# Patient Record
Sex: MALE | Race: WHITE | NOT HISPANIC OR LATINO | Employment: OTHER | URBAN - METROPOLITAN AREA
[De-identification: names, ages, dates, MRNs, and addresses within clinical notes are randomized per-mention and may not be internally consistent; named-entity substitution may affect disease eponyms.]

---

## 2018-03-14 ENCOUNTER — OFFICE VISIT (OUTPATIENT)
Dept: PRIMARY CARE CLINIC | Facility: CLINIC | Age: 23
End: 2018-03-14
Payer: COMMERCIAL

## 2018-03-14 VITALS
BODY MASS INDEX: 29.12 KG/M2 | SYSTOLIC BLOOD PRESSURE: 134 MMHG | HEART RATE: 77 BPM | HEIGHT: 71 IN | TEMPERATURE: 98 F | RESPIRATION RATE: 18 BRPM | DIASTOLIC BLOOD PRESSURE: 85 MMHG | OXYGEN SATURATION: 97 % | WEIGHT: 208 LBS

## 2018-03-14 DIAGNOSIS — E10.9 TYPE 1 DIABETES MELLITUS WITHOUT COMPLICATION: ICD-10-CM

## 2018-03-14 DIAGNOSIS — K59.39: ICD-10-CM

## 2018-03-14 DIAGNOSIS — K59.09 CHRONIC CONSTIPATION: Primary | ICD-10-CM

## 2018-03-14 PROBLEM — F42.9 OCD (OBSESSIVE COMPULSIVE DISORDER): Status: ACTIVE | Noted: 2018-03-14

## 2018-03-14 PROBLEM — F95.2 TOURETTE DISEASE: Status: ACTIVE | Noted: 2018-03-14

## 2018-03-14 PROBLEM — F84.0 AUTISM SPECTRUM DISORDER: Status: ACTIVE | Noted: 2018-03-14

## 2018-03-14 PROBLEM — F90.2 ATTENTION DEFICIT HYPERACTIVITY DISORDER, COMBINED TYPE: Status: ACTIVE | Noted: 2018-03-14

## 2018-03-14 PROCEDURE — 99999 PR PBB SHADOW E&M-EST. PATIENT-LVL III: CPT | Mod: PBBFAC,,, | Performed by: FAMILY MEDICINE

## 2018-03-14 PROCEDURE — 99204 OFFICE O/P NEW MOD 45 MIN: CPT | Mod: S$GLB,,, | Performed by: FAMILY MEDICINE

## 2018-03-14 RX ORDER — INSULIN LISPRO 100 [IU]/ML
90 INJECTION, SOLUTION INTRAVENOUS; SUBCUTANEOUS DAILY
COMMUNITY
Start: 2018-03-05 | End: 2021-04-14 | Stop reason: SDUPTHER

## 2018-03-14 RX ORDER — CETIRIZINE HYDROCHLORIDE 10 MG/1
10 TABLET ORAL DAILY
COMMUNITY

## 2018-03-14 NOTE — PROGRESS NOTES
"Subjective:       Patient ID: Jonathon Daniel is a 22 y.o. male.    Chief Complaint: Constipation    Constipation   This is a chronic problem. The current episode started more than 1 year ago. The stool is described as firm. The patient is on a high fiber diet. He does not exercise regularly. There has been adequate water intake. Associated symptoms include abdominal pain and rectal pain. Pertinent negatives include no anorexia, difficulty urinating, fecal incontinence, fever, hematochezia, hemorrhoids, melena, nausea or vomiting. He has tried diet changes, fiber, laxatives and stool softeners (probiotics) for the symptoms. The treatment provided mild relief. His past medical history is significant for endocrine disease (type 1 diabetes). There is no history of abdominal surgery, neurologic disease, neuromuscular disease or radiation treatment.     Review of Systems   Constitutional: Negative for chills, fever and unexpected weight change.   HENT: Negative for trouble swallowing.    Eyes: Negative for visual disturbance.   Respiratory: Negative for shortness of breath.    Cardiovascular: Negative for chest pain.   Gastrointestinal: Positive for abdominal pain, constipation and rectal pain. Negative for anorexia, blood in stool, hematochezia, hemorrhoids, melena, nausea and vomiting.   Genitourinary: Negative for difficulty urinating.   Musculoskeletal: Negative for joint swelling.   Skin: Negative for rash.   Neurological: Negative for dizziness and light-headedness.   Hematological: Does not bruise/bleed easily.       Objective:      Vitals:    03/14/18 1607   BP: 134/85   BP Location: Right arm   Patient Position: Sitting   BP Method: Large (Automatic)   Pulse: 77   Resp: 18   Temp: 98.1 °F (36.7 °C)   TempSrc: Oral   SpO2: 97%   Weight: 94.3 kg (208 lb)   Height: 5' 11" (1.803 m)     Physical Exam   Constitutional: He is oriented to person, place, and time. He appears well-developed and well-nourished.   HENT: "   Head: Normocephalic and atraumatic.   Eyes: EOM are normal. Pupils are equal, round, and reactive to light.   Neck: Neck supple. No JVD present.   Cardiovascular: Normal rate, regular rhythm and normal heart sounds.    Pulmonary/Chest: Effort normal and breath sounds normal.   Abdominal: Soft. Bowel sounds are normal. He exhibits no distension and no mass. There is no tenderness. There is no rebound and no guarding.   Musculoskeletal: He exhibits no edema.   Neurological: He is alert and oriented to person, place, and time.   Skin: Skin is warm and dry.   Psychiatric: He has a normal mood and affect. His behavior is normal.   Nursing note and vitals reviewed.      Assessment:       1. Chronic constipation    2. Idiopathic megacolon    3. Type 1 diabetes mellitus without complication        Plan:       Chronic constipation  -     linaclotide (LINZESS) 290 mcg Cap; Take 1 capsule (290 mcg total) by mouth once daily.  Dispense: 30 capsule; Refill: 1    Idiopathic megacolon    Type 1 diabetes mellitus without complication  Comments:  A1C 8.9% in December, managed by endocrinologist      Medication List with Changes/Refills   New Medications    LINACLOTIDE (LINZESS) 290 MCG CAP    Take 1 capsule (290 mcg total) by mouth once daily.   Current Medications    CETIRIZINE (ZYRTEC) 10 MG TABLET    Take 10 mg by mouth once daily.    DOCUSATE SODIUM (STOOL SOFTENER ORAL)    Take by mouth once daily.    HUMALOG U-100 INSULIN 100 UNIT/ML INJECTION    42 Units once daily. Via Insulin Pump    POLYETHYLENE GLYCOL 3350 (MIRALAX ORAL)    Take by mouth once daily.

## 2018-04-23 DIAGNOSIS — K59.09 CHRONIC CONSTIPATION: ICD-10-CM

## 2018-04-23 RX ORDER — LINACLOTIDE 290 UG/1
290 CAPSULE, GELATIN COATED ORAL DAILY
Qty: 30 CAPSULE | Refills: 5 | Status: SHIPPED | OUTPATIENT
Start: 2018-04-23 | End: 2018-11-16

## 2018-11-16 DIAGNOSIS — K59.09 CHRONIC CONSTIPATION: ICD-10-CM

## 2018-11-16 RX ORDER — LINACLOTIDE 290 UG/1
CAPSULE, GELATIN COATED ORAL
Qty: 30 CAPSULE | Refills: 1 | Status: SHIPPED | OUTPATIENT
Start: 2018-11-16 | End: 2018-12-15 | Stop reason: SDUPTHER

## 2018-12-15 DIAGNOSIS — K59.09 CHRONIC CONSTIPATION: ICD-10-CM

## 2018-12-17 RX ORDER — LINACLOTIDE 290 UG/1
CAPSULE, GELATIN COATED ORAL
Qty: 30 CAPSULE | Refills: 1 | Status: SHIPPED | OUTPATIENT
Start: 2018-12-17 | End: 2019-03-16 | Stop reason: SDUPTHER

## 2019-02-11 DIAGNOSIS — K59.09 CHRONIC CONSTIPATION: ICD-10-CM

## 2019-02-11 RX ORDER — LINACLOTIDE 290 UG/1
CAPSULE, GELATIN COATED ORAL
Qty: 30 CAPSULE | Refills: 1 | OUTPATIENT
Start: 2019-02-11

## 2019-03-16 DIAGNOSIS — K59.09 CHRONIC CONSTIPATION: ICD-10-CM

## 2019-03-19 RX ORDER — LINACLOTIDE 290 UG/1
CAPSULE, GELATIN COATED ORAL
Qty: 30 CAPSULE | Refills: 1 | Status: SHIPPED | OUTPATIENT
Start: 2019-03-19 | End: 2019-04-22 | Stop reason: SDUPTHER

## 2019-04-22 DIAGNOSIS — K59.09 CHRONIC CONSTIPATION: ICD-10-CM

## 2019-04-22 RX ORDER — LINACLOTIDE 290 UG/1
CAPSULE, GELATIN COATED ORAL
Qty: 30 CAPSULE | Refills: 1 | Status: SHIPPED | OUTPATIENT
Start: 2019-04-22 | End: 2019-07-09 | Stop reason: SDUPTHER

## 2019-07-09 DIAGNOSIS — K59.09 CHRONIC CONSTIPATION: ICD-10-CM

## 2019-07-09 RX ORDER — LINACLOTIDE 290 UG/1
CAPSULE, GELATIN COATED ORAL
Qty: 30 CAPSULE | Refills: 1 | Status: SHIPPED | OUTPATIENT
Start: 2019-07-09 | End: 2019-10-22 | Stop reason: SDUPTHER

## 2019-09-03 ENCOUNTER — PATIENT OUTREACH (OUTPATIENT)
Dept: ADMINISTRATIVE | Facility: HOSPITAL | Age: 24
End: 2019-09-03

## 2019-09-03 NOTE — PROGRESS NOTES
Patient has not been seen in over a year.  Spoke w/ patient's mother, Ya.   Patient scheduled for follow up 10/07/19.

## 2019-10-22 DIAGNOSIS — K59.09 CHRONIC CONSTIPATION: ICD-10-CM

## 2019-10-22 RX ORDER — LINACLOTIDE 290 UG/1
CAPSULE, GELATIN COATED ORAL
Qty: 30 CAPSULE | Refills: 0 | Status: SHIPPED | OUTPATIENT
Start: 2019-10-22 | End: 2019-12-13 | Stop reason: SDUPTHER

## 2019-12-13 DIAGNOSIS — K59.09 CHRONIC CONSTIPATION: ICD-10-CM

## 2019-12-13 RX ORDER — LINACLOTIDE 290 UG/1
CAPSULE, GELATIN COATED ORAL
Qty: 30 CAPSULE | Refills: 0 | Status: SHIPPED | OUTPATIENT
Start: 2019-12-13 | End: 2020-01-20

## 2019-12-13 NOTE — TELEPHONE ENCOUNTER
Please let the patient know I have refilled the requested medication for 30 days.  A follow-up needs to be scheduled as the patient has not been seen in an extensive amount of time.  Please see PCP for any further refills.

## 2019-12-13 NOTE — TELEPHONE ENCOUNTER
Spoke with patient's mother, notified her that medication was refilled but for any additional refills he will have to come in to be seen in clinic. States understanding

## 2020-01-20 DIAGNOSIS — K59.09 CHRONIC CONSTIPATION: ICD-10-CM

## 2020-01-20 RX ORDER — LINACLOTIDE 290 UG/1
CAPSULE, GELATIN COATED ORAL
Qty: 30 CAPSULE | Refills: 0 | Status: SHIPPED | OUTPATIENT
Start: 2020-01-20 | End: 2021-06-15

## 2020-01-23 ENCOUNTER — TELEPHONE (OUTPATIENT)
Dept: PRIMARY CARE CLINIC | Facility: CLINIC | Age: 25
End: 2020-01-23

## 2020-01-23 NOTE — TELEPHONE ENCOUNTER
Phoned pt got voicemail left msg med sent to pharm and pt needs to sched appt with pcp, left ofc number for pt to call with any further questions

## 2020-01-23 NOTE — TELEPHONE ENCOUNTER
Phoned pt to notify that refill request was sent to the pharmacy but patient does need to schedule a follow up appointment with his pcp

## 2020-02-03 ENCOUNTER — TELEPHONE (OUTPATIENT)
Dept: PRIMARY CARE CLINIC | Facility: CLINIC | Age: 25
End: 2020-02-03

## 2020-02-03 NOTE — TELEPHONE ENCOUNTER
----- Message from Obdulia Joshi sent at 2/3/2020 12:19 PM CST -----  Contact: [phone]  Patient is requesting to see the doctor on 02- for a physical and medication review.  Kenneth advise, thank you

## 2021-04-13 ENCOUNTER — OFFICE VISIT (OUTPATIENT)
Dept: DIABETES | Facility: CLINIC | Age: 26
End: 2021-04-13
Payer: MEDICAID

## 2021-04-13 VITALS
WEIGHT: 200 LBS | HEIGHT: 70 IN | OXYGEN SATURATION: 98 % | HEART RATE: 103 BPM | SYSTOLIC BLOOD PRESSURE: 142 MMHG | BODY MASS INDEX: 28.63 KG/M2 | DIASTOLIC BLOOD PRESSURE: 77 MMHG

## 2021-04-13 DIAGNOSIS — E10.65 TYPE 1 DIABETES MELLITUS WITH HYPERGLYCEMIA: Primary | ICD-10-CM

## 2021-04-13 DIAGNOSIS — E88.819 INSULIN RESISTANCE: ICD-10-CM

## 2021-04-13 DIAGNOSIS — Z96.41 INSULIN PUMP IN PLACE: ICD-10-CM

## 2021-04-13 DIAGNOSIS — R11.2 NAUSEA AND VOMITING, INTRACTABILITY OF VOMITING NOT SPECIFIED, UNSPECIFIED VOMITING TYPE: ICD-10-CM

## 2021-04-13 DIAGNOSIS — Z46.81 INSULIN PUMP FITTING OR ADJUSTMENT: ICD-10-CM

## 2021-04-13 DIAGNOSIS — Z86.69 HISTORY OF ANOXIC BRAIN INJURY: ICD-10-CM

## 2021-04-13 DIAGNOSIS — Z71.9 HEALTH EDUCATION/COUNSELING: ICD-10-CM

## 2021-04-13 DIAGNOSIS — R03.0 ELEVATED BLOOD PRESSURE READING IN OFFICE WITHOUT DIAGNOSIS OF HYPERTENSION: ICD-10-CM

## 2021-04-13 PROBLEM — G93.1 ANOXIC BRAIN INJURY: Status: ACTIVE | Noted: 2021-04-13

## 2021-04-13 PROCEDURE — 99205 OFFICE O/P NEW HI 60 MIN: CPT | Mod: S$PBB,,, | Performed by: NURSE PRACTITIONER

## 2021-04-13 PROCEDURE — 99999 PR PBB SHADOW E&M-NEW PATIENT-LVL III: ICD-10-PCS | Mod: PBBFAC,,, | Performed by: NURSE PRACTITIONER

## 2021-04-13 PROCEDURE — 99205 PR OFFICE/OUTPT VISIT, NEW, LEVL V, 60-74 MIN: ICD-10-PCS | Mod: S$PBB,,, | Performed by: NURSE PRACTITIONER

## 2021-04-13 PROCEDURE — 99999 PR PBB SHADOW E&M-NEW PATIENT-LVL III: CPT | Mod: PBBFAC,,, | Performed by: NURSE PRACTITIONER

## 2021-04-13 PROCEDURE — 99203 OFFICE O/P NEW LOW 30 MIN: CPT | Mod: PBBFAC,PN | Performed by: NURSE PRACTITIONER

## 2021-04-13 RX ORDER — INSULIN ASPART 100 [IU]/ML
INJECTION, SOLUTION INTRAVENOUS; SUBCUTANEOUS
COMMUNITY
End: 2021-04-14

## 2021-04-13 RX ORDER — GLUCAGON 3 MG/1
POWDER NASAL
Qty: 2 EACH | Refills: 1 | Status: SHIPPED | OUTPATIENT
Start: 2021-04-13

## 2021-04-14 DIAGNOSIS — E10.65 TYPE 1 DIABETES MELLITUS WITH HYPERGLYCEMIA: Primary | ICD-10-CM

## 2021-04-14 PROBLEM — E88.819 INSULIN RESISTANCE: Status: ACTIVE | Noted: 2021-04-14

## 2021-04-14 PROBLEM — Z86.69 HISTORY OF ANOXIC BRAIN INJURY: Status: ACTIVE | Noted: 2021-04-13

## 2021-04-14 PROBLEM — Z46.81 INSULIN PUMP FITTING OR ADJUSTMENT: Status: ACTIVE | Noted: 2021-04-14

## 2021-04-14 RX ORDER — INSULIN DETEMIR 100 [IU]/ML
INJECTION, SOLUTION SUBCUTANEOUS
Qty: 1 BOX | Refills: 2 | Status: SHIPPED | OUTPATIENT
Start: 2021-04-14 | End: 2021-06-16 | Stop reason: SDUPTHER

## 2021-04-14 RX ORDER — PEN NEEDLE, DIABETIC 30 GX3/16"
NEEDLE, DISPOSABLE MISCELLANEOUS
Qty: 30 EACH | Refills: 11 | Status: SHIPPED | OUTPATIENT
Start: 2021-04-14 | End: 2021-06-16 | Stop reason: SDUPTHER

## 2021-04-14 RX ORDER — GLUCAGON 3 MG/1
POWDER NASAL
Qty: 2 EACH | Refills: 1 | Status: SHIPPED | OUTPATIENT
Start: 2021-04-14 | End: 2021-05-26

## 2021-04-14 RX ORDER — INSULIN ASPART 100 [IU]/ML
INJECTION, SOLUTION INTRAVENOUS; SUBCUTANEOUS
Qty: 5 VIAL | Refills: 6 | Status: SHIPPED | OUTPATIENT
Start: 2021-04-14 | End: 2021-06-16 | Stop reason: SDUPTHER

## 2021-04-14 RX ORDER — INSULIN LISPRO 100 [IU]/ML
INJECTION, SOLUTION INTRAVENOUS; SUBCUTANEOUS
Qty: 5 VIAL | Refills: 6 | Status: SHIPPED | OUTPATIENT
Start: 2021-04-14 | End: 2021-06-16

## 2021-04-19 ENCOUNTER — TELEPHONE (OUTPATIENT)
Dept: DIABETES | Facility: CLINIC | Age: 26
End: 2021-04-19

## 2021-04-26 ENCOUNTER — PATIENT MESSAGE (OUTPATIENT)
Dept: RESEARCH | Facility: HOSPITAL | Age: 26
End: 2021-04-26

## 2021-04-26 LAB
ALBUMIN SERPL-MCNC: 4.4 G/DL (ref 3.6–5.1)
ALBUMIN/CREAT UR: 2 MCG/MG CREAT
ALBUMIN/GLOB SERPL: 1.8 (CALC) (ref 1–2.5)
ALP SERPL-CCNC: 75 U/L (ref 36–130)
ALT SERPL-CCNC: 19 U/L (ref 9–46)
AST SERPL-CCNC: 16 U/L (ref 10–40)
BASOPHILS # BLD AUTO: 28 CELLS/UL (ref 0–200)
BASOPHILS NFR BLD AUTO: 0.6 %
BILIRUB SERPL-MCNC: 0.5 MG/DL (ref 0.2–1.2)
BLASTS # BLD: NORMAL CELLS/UL
BLASTS NFR BLD MANUAL: NORMAL %
BUN SERPL-MCNC: 19 MG/DL (ref 7–25)
BUN/CREAT SERPL: ABNORMAL (CALC) (ref 6–22)
C PEPTIDE SERPL-MCNC: <0.1 NG/ML (ref 0.8–3.85)
CALCIUM SERPL-MCNC: 9.6 MG/DL (ref 8.6–10.3)
CHLORIDE SERPL-SCNC: 104 MMOL/L (ref 98–110)
CHOLEST SERPL-MCNC: 153 MG/DL
CHOLEST/HDLC SERPL: 4.9 (CALC)
CO2 SERPL-SCNC: 27 MMOL/L (ref 20–32)
CREAT SERPL-MCNC: 0.9 MG/DL (ref 0.6–1.35)
CREAT UR-MCNC: 161 MG/DL (ref 20–320)
EOSINOPHIL # BLD AUTO: 103 CELLS/UL (ref 15–500)
EOSINOPHIL NFR BLD AUTO: 2.2 %
ERYTHROCYTE [DISTWIDTH] IN BLOOD BY AUTOMATED COUNT: 13.2 % (ref 11–15)
GAD65 AB SER IA-ACNC: 27 IU/ML
GLOBULIN SER CALC-MCNC: 2.5 G/DL (CALC) (ref 1.9–3.7)
GLUCOSE SERPL-MCNC: 211 MG/DL (ref 65–99)
HBA1C MFR BLD: 8.8 % OF TOTAL HGB
HCT VFR BLD AUTO: 46.4 % (ref 38.5–50)
HDLC SERPL-MCNC: 31 MG/DL
HGB BLD-MCNC: 15.3 G/DL (ref 13.2–17.1)
LDLC SERPL CALC-MCNC: 90 MG/DL (CALC)
LYMPHOCYTES # BLD AUTO: 1669 CELLS/UL (ref 850–3900)
LYMPHOCYTES NFR BLD AUTO: 35.5 %
MCH RBC QN AUTO: 29.6 PG (ref 27–33)
MCHC RBC AUTO-ENTMCNC: 33 G/DL (ref 32–36)
MCV RBC AUTO: 89.7 FL (ref 80–100)
METAMYELOCYTES # BLD: NORMAL CELLS/UL
METAMYELOCYTES NFR BLD MANUAL: NORMAL %
MICROALBUMIN UR-MCNC: 0.4 MG/DL
MONOCYTES # BLD AUTO: 447 CELLS/UL (ref 200–950)
MONOCYTES NFR BLD AUTO: 9.5 %
MYELOCYTES # BLD: NORMAL CELLS/UL
MYELOCYTES NFR BLD MANUAL: NORMAL %
NEUTROPHILS # BLD AUTO: 2453 CELLS/UL (ref 1500–7800)
NEUTROPHILS NFR BLD AUTO: 52.2 %
NEUTS BAND # BLD: NORMAL CELLS/UL (ref 0–750)
NEUTS BAND NFR BLD MANUAL: NORMAL %
NONHDLC SERPL-MCNC: 122 MG/DL (CALC)
NRBC # BLD: NORMAL CELLS/UL
NRBC BLD-RTO: NORMAL /100 WBC
PANC ISLET CELL AB SER QL IF: NEGATIVE
PLATELET # BLD AUTO: 237 THOUSAND/UL (ref 140–400)
PMV BLD REES-ECKER: 10 FL (ref 7.5–12.5)
POTASSIUM SERPL-SCNC: 4.5 MMOL/L (ref 3.5–5.3)
PROMYELOCYTES # BLD: NORMAL CELLS/UL
PROMYELOCYTES NFR BLD MANUAL: NORMAL %
PROT SERPL-MCNC: 6.9 G/DL (ref 6.1–8.1)
RBC # BLD AUTO: 5.17 MILLION/UL (ref 4.2–5.8)
SERVICE CMNT-IMP: NORMAL
SODIUM SERPL-SCNC: 137 MMOL/L (ref 135–146)
TRIGL SERPL-MCNC: 219 MG/DL
TSH SERPL-ACNC: 2.09 MIU/L (ref 0.4–4.5)
VARIANT LYMPHS NFR BLD: NORMAL % (ref 0–10)
WBC # BLD AUTO: 4.7 THOUSAND/UL (ref 3.8–10.8)

## 2021-05-12 ENCOUNTER — NUTRITION (OUTPATIENT)
Dept: DIABETES | Facility: CLINIC | Age: 26
End: 2021-05-12
Payer: MEDICAID

## 2021-05-12 ENCOUNTER — PATIENT MESSAGE (OUTPATIENT)
Dept: DIABETES | Facility: CLINIC | Age: 26
End: 2021-05-12

## 2021-05-12 ENCOUNTER — TELEPHONE (OUTPATIENT)
Dept: DIABETES | Facility: CLINIC | Age: 26
End: 2021-05-12

## 2021-05-12 DIAGNOSIS — E10.65 TYPE 1 DIABETES MELLITUS WITH HYPERGLYCEMIA: ICD-10-CM

## 2021-05-12 PROCEDURE — G0108 DIAB MANAGE TRN  PER INDIV: HCPCS | Mod: PBBFAC,PN | Performed by: DIETITIAN, REGISTERED

## 2021-05-26 ENCOUNTER — OFFICE VISIT (OUTPATIENT)
Dept: DIABETES | Facility: CLINIC | Age: 26
End: 2021-05-26
Payer: MEDICAID

## 2021-05-26 VITALS
WEIGHT: 218.5 LBS | DIASTOLIC BLOOD PRESSURE: 64 MMHG | HEIGHT: 71 IN | TEMPERATURE: 98 F | BODY MASS INDEX: 30.59 KG/M2 | HEART RATE: 77 BPM | SYSTOLIC BLOOD PRESSURE: 132 MMHG | OXYGEN SATURATION: 98 %

## 2021-05-26 DIAGNOSIS — E88.819 INSULIN RESISTANCE: ICD-10-CM

## 2021-05-26 DIAGNOSIS — Z96.41 INSULIN PUMP IN PLACE: ICD-10-CM

## 2021-05-26 DIAGNOSIS — E10.649 TYPE 1 DIABETES MELLITUS WITH HYPOGLYCEMIA AND WITHOUT COMA: ICD-10-CM

## 2021-05-26 DIAGNOSIS — E10.649 TYPE 1 DIABETES MELLITUS WITH HYPOGLYCEMIA UNAWARENESS: ICD-10-CM

## 2021-05-26 DIAGNOSIS — Z86.69 HISTORY OF ANOXIC BRAIN INJURY: ICD-10-CM

## 2021-05-26 DIAGNOSIS — Z71.9 HEALTH EDUCATION/COUNSELING: ICD-10-CM

## 2021-05-26 DIAGNOSIS — E10.65 TYPE 1 DIABETES MELLITUS WITH HYPERGLYCEMIA: Primary | ICD-10-CM

## 2021-05-26 DIAGNOSIS — F84.0 AUTISM SPECTRUM DISORDER: ICD-10-CM

## 2021-05-26 DIAGNOSIS — Z46.81 INSULIN PUMP FITTING OR ADJUSTMENT: ICD-10-CM

## 2021-05-26 PROCEDURE — 95251 CONT GLUC MNTR ANALYSIS I&R: CPT | Mod: ,,, | Performed by: NURSE PRACTITIONER

## 2021-05-26 PROCEDURE — 99214 PR OFFICE/OUTPT VISIT, EST, LEVL IV, 30-39 MIN: ICD-10-PCS | Mod: S$PBB,,, | Performed by: NURSE PRACTITIONER

## 2021-05-26 PROCEDURE — 99999 PR PBB SHADOW E&M-EST. PATIENT-LVL V: ICD-10-PCS | Mod: PBBFAC,,, | Performed by: NURSE PRACTITIONER

## 2021-05-26 PROCEDURE — 99214 OFFICE O/P EST MOD 30 MIN: CPT | Mod: S$PBB,,, | Performed by: NURSE PRACTITIONER

## 2021-05-26 PROCEDURE — 99215 OFFICE O/P EST HI 40 MIN: CPT | Mod: PBBFAC,PN | Performed by: NURSE PRACTITIONER

## 2021-05-26 PROCEDURE — 99999 PR PBB SHADOW E&M-EST. PATIENT-LVL V: CPT | Mod: PBBFAC,,, | Performed by: NURSE PRACTITIONER

## 2021-05-26 PROCEDURE — 95251 PR GLUCOSE MONITOR, 72 HOUR, PHYS INTERP: ICD-10-PCS | Mod: ,,, | Performed by: NURSE PRACTITIONER

## 2021-06-03 ENCOUNTER — TELEPHONE (OUTPATIENT)
Dept: DIABETES | Facility: CLINIC | Age: 26
End: 2021-06-03

## 2021-06-03 ENCOUNTER — PATIENT MESSAGE (OUTPATIENT)
Dept: DIABETES | Facility: CLINIC | Age: 26
End: 2021-06-03

## 2021-06-04 ENCOUNTER — NUTRITION (OUTPATIENT)
Dept: DIABETES | Facility: CLINIC | Age: 26
End: 2021-06-04
Payer: MEDICAID

## 2021-06-04 DIAGNOSIS — E10.65 TYPE 1 DIABETES MELLITUS WITH HYPERGLYCEMIA: Primary | ICD-10-CM

## 2021-06-04 PROCEDURE — G0108 DIAB MANAGE TRN  PER INDIV: HCPCS | Mod: PBBFAC,PN | Performed by: DIETITIAN, REGISTERED

## 2021-06-04 PROCEDURE — 99211 OFF/OP EST MAY X REQ PHY/QHP: CPT | Mod: PBBFAC,PN | Performed by: DIETITIAN, REGISTERED

## 2021-06-04 PROCEDURE — 99999 PR PBB SHADOW E&M-EST. PATIENT-LVL I: CPT | Mod: PBBFAC,,, | Performed by: DIETITIAN, REGISTERED

## 2021-06-04 PROCEDURE — 99999 PR PBB SHADOW E&M-EST. PATIENT-LVL I: ICD-10-PCS | Mod: PBBFAC,,, | Performed by: DIETITIAN, REGISTERED

## 2021-06-10 ENCOUNTER — NUTRITION (OUTPATIENT)
Dept: DIABETES | Facility: CLINIC | Age: 26
End: 2021-06-10
Payer: MEDICAID

## 2021-06-10 DIAGNOSIS — E10.65 TYPE 1 DIABETES MELLITUS WITH HYPERGLYCEMIA: Primary | ICD-10-CM

## 2021-06-10 PROCEDURE — 99211 OFF/OP EST MAY X REQ PHY/QHP: CPT | Mod: PBBFAC,PN | Performed by: DIETITIAN, REGISTERED

## 2021-06-10 PROCEDURE — 99999 PR PBB SHADOW E&M-EST. PATIENT-LVL I: ICD-10-PCS | Mod: PBBFAC,,, | Performed by: DIETITIAN, REGISTERED

## 2021-06-10 PROCEDURE — 99999 PR PBB SHADOW E&M-EST. PATIENT-LVL I: CPT | Mod: PBBFAC,,, | Performed by: DIETITIAN, REGISTERED

## 2021-06-10 PROCEDURE — G0108 DIAB MANAGE TRN  PER INDIV: HCPCS | Mod: PBBFAC,PN | Performed by: DIETITIAN, REGISTERED

## 2021-06-15 ENCOUNTER — OFFICE VISIT (OUTPATIENT)
Dept: PRIMARY CARE CLINIC | Facility: CLINIC | Age: 26
End: 2021-06-15
Payer: MEDICAID

## 2021-06-15 VITALS
HEIGHT: 71 IN | OXYGEN SATURATION: 98 % | HEART RATE: 70 BPM | DIASTOLIC BLOOD PRESSURE: 80 MMHG | SYSTOLIC BLOOD PRESSURE: 108 MMHG | WEIGHT: 218.25 LBS | TEMPERATURE: 98 F | BODY MASS INDEX: 30.56 KG/M2 | RESPIRATION RATE: 18 BRPM

## 2021-06-15 DIAGNOSIS — Z11.4 SCREENING FOR HIV (HUMAN IMMUNODEFICIENCY VIRUS): ICD-10-CM

## 2021-06-15 DIAGNOSIS — Z23 NEED FOR VACCINATION: ICD-10-CM

## 2021-06-15 DIAGNOSIS — E10.65 TYPE 1 DIABETES MELLITUS WITH HYPERGLYCEMIA: ICD-10-CM

## 2021-06-15 DIAGNOSIS — Z11.59 NEED FOR HEPATITIS C SCREENING TEST: ICD-10-CM

## 2021-06-15 DIAGNOSIS — F84.0 AUTISM SPECTRUM DISORDER: ICD-10-CM

## 2021-06-15 DIAGNOSIS — Z00.00 ANNUAL PHYSICAL EXAM: Primary | ICD-10-CM

## 2021-06-15 PROCEDURE — 99214 OFFICE O/P EST MOD 30 MIN: CPT | Mod: PBBFAC,PN,25 | Performed by: FAMILY MEDICINE

## 2021-06-15 PROCEDURE — 99395 PREV VISIT EST AGE 18-39: CPT | Mod: S$PBB,,, | Performed by: FAMILY MEDICINE

## 2021-06-15 PROCEDURE — 99395 PR PREVENTIVE VISIT,EST,18-39: ICD-10-PCS | Mod: S$PBB,,, | Performed by: FAMILY MEDICINE

## 2021-06-15 PROCEDURE — 90471 IMMUNIZATION ADMIN: CPT | Mod: PBBFAC,PN

## 2021-06-15 PROCEDURE — 99999 PR PBB SHADOW E&M-EST. PATIENT-LVL IV: CPT | Mod: PBBFAC,,, | Performed by: FAMILY MEDICINE

## 2021-06-15 PROCEDURE — 99999 PR PBB SHADOW E&M-EST. PATIENT-LVL IV: ICD-10-PCS | Mod: PBBFAC,,, | Performed by: FAMILY MEDICINE

## 2021-06-16 ENCOUNTER — OFFICE VISIT (OUTPATIENT)
Dept: DIABETES | Facility: CLINIC | Age: 26
End: 2021-06-16
Payer: MEDICAID

## 2021-06-16 VITALS
HEIGHT: 71 IN | HEART RATE: 73 BPM | OXYGEN SATURATION: 98 % | DIASTOLIC BLOOD PRESSURE: 70 MMHG | WEIGHT: 217.5 LBS | SYSTOLIC BLOOD PRESSURE: 130 MMHG | BODY MASS INDEX: 30.45 KG/M2 | TEMPERATURE: 98 F

## 2021-06-16 DIAGNOSIS — Z46.81 INSULIN PUMP FITTING OR ADJUSTMENT: ICD-10-CM

## 2021-06-16 DIAGNOSIS — Z96.41 INSULIN PUMP IN PLACE: ICD-10-CM

## 2021-06-16 DIAGNOSIS — Z71.9 HEALTH EDUCATION/COUNSELING: ICD-10-CM

## 2021-06-16 DIAGNOSIS — F84.0 AUTISM SPECTRUM DISORDER: ICD-10-CM

## 2021-06-16 DIAGNOSIS — E88.819 INSULIN RESISTANCE: ICD-10-CM

## 2021-06-16 DIAGNOSIS — E66.09 CLASS 1 OBESITY DUE TO EXCESS CALORIES WITH SERIOUS COMORBIDITY AND BODY MASS INDEX (BMI) OF 30.0 TO 30.9 IN ADULT: ICD-10-CM

## 2021-06-16 DIAGNOSIS — E78.1 HYPERTRIGLYCERIDEMIA: ICD-10-CM

## 2021-06-16 DIAGNOSIS — E10.65 TYPE 1 DIABETES MELLITUS WITH HYPERGLYCEMIA: Primary | ICD-10-CM

## 2021-06-16 PROBLEM — E66.811 CLASS 1 OBESITY DUE TO EXCESS CALORIES WITH SERIOUS COMORBIDITY AND BODY MASS INDEX (BMI) OF 30.0 TO 30.9 IN ADULT: Status: ACTIVE | Noted: 2021-06-16

## 2021-06-16 PROCEDURE — 95251 CONT GLUC MNTR ANALYSIS I&R: CPT | Mod: ,,, | Performed by: NURSE PRACTITIONER

## 2021-06-16 PROCEDURE — 99999 PR PBB SHADOW E&M-EST. PATIENT-LVL III: ICD-10-PCS | Mod: PBBFAC,,, | Performed by: NURSE PRACTITIONER

## 2021-06-16 PROCEDURE — 99213 OFFICE O/P EST LOW 20 MIN: CPT | Mod: PBBFAC,PN | Performed by: NURSE PRACTITIONER

## 2021-06-16 PROCEDURE — 99999 PR PBB SHADOW E&M-EST. PATIENT-LVL III: CPT | Mod: PBBFAC,,, | Performed by: NURSE PRACTITIONER

## 2021-06-16 PROCEDURE — 95251 PR GLUCOSE MONITOR, 72 HOUR, PHYS INTERP: ICD-10-PCS | Mod: ,,, | Performed by: NURSE PRACTITIONER

## 2021-06-16 PROCEDURE — 99214 OFFICE O/P EST MOD 30 MIN: CPT | Mod: S$PBB,,, | Performed by: NURSE PRACTITIONER

## 2021-06-16 PROCEDURE — 99214 PR OFFICE/OUTPT VISIT, EST, LEVL IV, 30-39 MIN: ICD-10-PCS | Mod: S$PBB,,, | Performed by: NURSE PRACTITIONER

## 2021-06-16 RX ORDER — INSULIN DETEMIR 100 [IU]/ML
INJECTION, SOLUTION SUBCUTANEOUS
Qty: 1 BOX | Refills: 6 | Status: SHIPPED | OUTPATIENT
Start: 2021-06-16

## 2021-06-16 RX ORDER — PEN NEEDLE, DIABETIC 30 GX3/16"
NEEDLE, DISPOSABLE MISCELLANEOUS
Qty: 150 EACH | Refills: 11 | Status: SHIPPED | OUTPATIENT
Start: 2021-06-16

## 2021-06-16 RX ORDER — INSULIN ASPART 100 [IU]/ML
INJECTION, SOLUTION INTRAVENOUS; SUBCUTANEOUS
Qty: 5 VIAL | Refills: 6 | Status: SHIPPED | OUTPATIENT
Start: 2021-06-16 | End: 2021-10-28 | Stop reason: SDUPTHER

## 2021-06-16 RX ORDER — INSULIN ASPART 100 [IU]/ML
INJECTION, SOLUTION INTRAVENOUS; SUBCUTANEOUS
Qty: 1 BOX | Refills: 6 | Status: SHIPPED | OUTPATIENT
Start: 2021-06-16 | End: 2021-10-28 | Stop reason: SDUPTHER

## 2021-10-05 ENCOUNTER — PATIENT MESSAGE (OUTPATIENT)
Dept: ADMINISTRATIVE | Facility: HOSPITAL | Age: 26
End: 2021-10-05

## 2021-10-16 LAB
BUN SERPL-MCNC: 15 MG/DL (ref 7–25)
BUN/CREAT SERPL: ABNORMAL (CALC) (ref 6–22)
CALCIUM SERPL-MCNC: 9.1 MG/DL (ref 8.6–10.3)
CHLORIDE SERPL-SCNC: 107 MMOL/L (ref 98–110)
CO2 SERPL-SCNC: 28 MMOL/L (ref 20–32)
CREAT SERPL-MCNC: 0.97 MG/DL (ref 0.6–1.35)
GLUCOSE SERPL-MCNC: 116 MG/DL (ref 65–99)
HBA1C MFR BLD: 6.6 % OF TOTAL HGB
POTASSIUM SERPL-SCNC: 4.4 MMOL/L (ref 3.5–5.3)
SODIUM SERPL-SCNC: 140 MMOL/L (ref 135–146)

## 2021-10-18 ENCOUNTER — TELEPHONE (OUTPATIENT)
Dept: DIABETES | Facility: CLINIC | Age: 26
End: 2021-10-18

## 2021-10-19 ENCOUNTER — TELEPHONE (OUTPATIENT)
Dept: DIABETES | Facility: CLINIC | Age: 26
End: 2021-10-19

## 2021-10-28 ENCOUNTER — OFFICE VISIT (OUTPATIENT)
Dept: DIABETES | Facility: CLINIC | Age: 26
End: 2021-10-28
Payer: MEDICAID

## 2021-10-28 VITALS
HEART RATE: 65 BPM | TEMPERATURE: 98 F | DIASTOLIC BLOOD PRESSURE: 72 MMHG | SYSTOLIC BLOOD PRESSURE: 128 MMHG | OXYGEN SATURATION: 99 % | WEIGHT: 216 LBS | HEIGHT: 71 IN | BODY MASS INDEX: 30.24 KG/M2

## 2021-10-28 DIAGNOSIS — Z96.41 INSULIN PUMP IN PLACE: ICD-10-CM

## 2021-10-28 DIAGNOSIS — E88.819 INSULIN RESISTANCE: ICD-10-CM

## 2021-10-28 DIAGNOSIS — F84.0 AUTISM SPECTRUM DISORDER: ICD-10-CM

## 2021-10-28 DIAGNOSIS — E66.09 CLASS 1 OBESITY DUE TO EXCESS CALORIES WITH SERIOUS COMORBIDITY AND BODY MASS INDEX (BMI) OF 30.0 TO 30.9 IN ADULT: ICD-10-CM

## 2021-10-28 DIAGNOSIS — Z46.81 INSULIN PUMP FITTING OR ADJUSTMENT: ICD-10-CM

## 2021-10-28 DIAGNOSIS — E10.65 TYPE 1 DIABETES MELLITUS WITH HYPERGLYCEMIA: Primary | ICD-10-CM

## 2021-10-28 PROCEDURE — 99215 PR OFFICE/OUTPT VISIT, EST, LEVL V, 40-54 MIN: ICD-10-PCS | Mod: S$PBB,,, | Performed by: NURSE PRACTITIONER

## 2021-10-28 PROCEDURE — 99214 OFFICE O/P EST MOD 30 MIN: CPT | Mod: PBBFAC,PN | Performed by: NURSE PRACTITIONER

## 2021-10-28 PROCEDURE — 95251 PR GLUCOSE MONITOR, 72 HOUR, PHYS INTERP: ICD-10-PCS | Mod: ,,, | Performed by: NURSE PRACTITIONER

## 2021-10-28 PROCEDURE — 99215 OFFICE O/P EST HI 40 MIN: CPT | Mod: S$PBB,,, | Performed by: NURSE PRACTITIONER

## 2021-10-28 PROCEDURE — 99999 PR PBB SHADOW E&M-EST. PATIENT-LVL IV: ICD-10-PCS | Mod: PBBFAC,,, | Performed by: NURSE PRACTITIONER

## 2021-10-28 PROCEDURE — 99999 PR PBB SHADOW E&M-EST. PATIENT-LVL IV: CPT | Mod: PBBFAC,,, | Performed by: NURSE PRACTITIONER

## 2021-10-28 PROCEDURE — 95251 CONT GLUC MNTR ANALYSIS I&R: CPT | Mod: ,,, | Performed by: NURSE PRACTITIONER

## 2021-10-28 RX ORDER — INSULIN ASPART 100 [IU]/ML
INJECTION, SOLUTION INTRAVENOUS; SUBCUTANEOUS
Qty: 6 EACH | Refills: 6 | Status: SHIPPED | OUTPATIENT
Start: 2021-10-28 | End: 2021-11-01 | Stop reason: SDUPTHER

## 2021-10-28 RX ORDER — INSULIN ASPART 100 [IU]/ML
INJECTION, SOLUTION INTRAVENOUS; SUBCUTANEOUS
Qty: 1 EACH | Refills: 6 | Status: SHIPPED | OUTPATIENT
Start: 2021-10-28

## 2021-11-01 DIAGNOSIS — E10.65 TYPE 1 DIABETES MELLITUS WITH HYPERGLYCEMIA: ICD-10-CM

## 2021-11-01 RX ORDER — INSULIN ASPART 100 [IU]/ML
INJECTION, SOLUTION INTRAVENOUS; SUBCUTANEOUS
Qty: 6 EACH | Refills: 6 | Status: SHIPPED | OUTPATIENT
Start: 2021-11-01 | End: 2022-02-08 | Stop reason: SDUPTHER

## 2022-01-21 ENCOUNTER — PATIENT MESSAGE (OUTPATIENT)
Dept: ADMINISTRATIVE | Facility: HOSPITAL | Age: 27
End: 2022-01-21
Payer: MEDICAID

## 2022-02-04 ENCOUNTER — PATIENT MESSAGE (OUTPATIENT)
Dept: DIABETES | Facility: CLINIC | Age: 27
End: 2022-02-04
Payer: MEDICAID

## 2022-02-04 ENCOUNTER — TELEPHONE (OUTPATIENT)
Dept: DIABETES | Facility: CLINIC | Age: 27
End: 2022-02-04
Payer: MEDICAID

## 2022-02-04 LAB
BUN SERPL-MCNC: 18 MG/DL (ref 7–25)
BUN/CREAT SERPL: ABNORMAL (CALC) (ref 6–22)
CALCIUM SERPL-MCNC: 9.3 MG/DL (ref 8.6–10.3)
CHLORIDE SERPL-SCNC: 104 MMOL/L (ref 98–110)
CO2 SERPL-SCNC: 26 MMOL/L (ref 20–32)
CREAT SERPL-MCNC: 0.98 MG/DL (ref 0.6–1.35)
GLUCOSE SERPL-MCNC: 184 MG/DL (ref 65–99)
HBA1C MFR BLD: 6.6 % OF TOTAL HGB
POTASSIUM SERPL-SCNC: 4.3 MMOL/L (ref 3.5–5.3)
SODIUM SERPL-SCNC: 136 MMOL/L (ref 135–146)

## 2022-02-04 NOTE — TELEPHONE ENCOUNTER
----- Message from Karuna Werner NP sent at 2/4/2022 10:20 AM CST -----  Please call lab results to patient  A1c is still at 6.6%!!!  Keep up the good work.  I am very proud of him.  Make sure that he brings his Dexcom and his insulin pump to our visit next week  His kidney function is stable  His blood sugar was a little high on lab work at 184  We will review everything at his visit

## 2022-02-08 ENCOUNTER — OFFICE VISIT (OUTPATIENT)
Dept: DIABETES | Facility: CLINIC | Age: 27
End: 2022-02-08
Payer: MEDICAID

## 2022-02-08 VITALS
OXYGEN SATURATION: 98 % | WEIGHT: 223.38 LBS | TEMPERATURE: 98 F | SYSTOLIC BLOOD PRESSURE: 122 MMHG | DIASTOLIC BLOOD PRESSURE: 78 MMHG | HEIGHT: 71 IN | HEART RATE: 82 BPM | BODY MASS INDEX: 31.27 KG/M2

## 2022-02-08 DIAGNOSIS — Z11.59 NEED FOR HEPATITIS C SCREENING TEST: ICD-10-CM

## 2022-02-08 DIAGNOSIS — F84.0 AUTISM SPECTRUM DISORDER: ICD-10-CM

## 2022-02-08 DIAGNOSIS — E66.09 CLASS 1 OBESITY DUE TO EXCESS CALORIES WITH SERIOUS COMORBIDITY AND BODY MASS INDEX (BMI) OF 31.0 TO 31.9 IN ADULT: ICD-10-CM

## 2022-02-08 DIAGNOSIS — E88.819 INSULIN RESISTANCE: ICD-10-CM

## 2022-02-08 DIAGNOSIS — Z96.41 INSULIN PUMP IN PLACE: ICD-10-CM

## 2022-02-08 DIAGNOSIS — E10.65 TYPE 1 DIABETES MELLITUS WITH HYPERGLYCEMIA: Primary | ICD-10-CM

## 2022-02-08 DIAGNOSIS — Z11.4 ENCOUNTER FOR SCREENING FOR HIV: ICD-10-CM

## 2022-02-08 PROCEDURE — 99999 PR PBB SHADOW E&M-EST. PATIENT-LVL IV: ICD-10-PCS | Mod: PBBFAC,,, | Performed by: NURSE PRACTITIONER

## 2022-02-08 PROCEDURE — 3078F PR MOST RECENT DIASTOLIC BLOOD PRESSURE < 80 MM HG: ICD-10-PCS | Mod: CPTII,,, | Performed by: NURSE PRACTITIONER

## 2022-02-08 PROCEDURE — 3008F PR BODY MASS INDEX (BMI) DOCUMENTED: ICD-10-PCS | Mod: CPTII,,, | Performed by: NURSE PRACTITIONER

## 2022-02-08 PROCEDURE — 3044F HG A1C LEVEL LT 7.0%: CPT | Mod: CPTII,,, | Performed by: NURSE PRACTITIONER

## 2022-02-08 PROCEDURE — 3078F DIAST BP <80 MM HG: CPT | Mod: CPTII,,, | Performed by: NURSE PRACTITIONER

## 2022-02-08 PROCEDURE — 99999 PR PBB SHADOW E&M-EST. PATIENT-LVL IV: CPT | Mod: PBBFAC,,, | Performed by: NURSE PRACTITIONER

## 2022-02-08 PROCEDURE — 95251 PR GLUCOSE MONITOR, 72 HOUR, PHYS INTERP: ICD-10-PCS | Mod: ,,, | Performed by: NURSE PRACTITIONER

## 2022-02-08 PROCEDURE — 99214 PR OFFICE/OUTPT VISIT, EST, LEVL IV, 30-39 MIN: ICD-10-PCS | Mod: S$PBB,,, | Performed by: NURSE PRACTITIONER

## 2022-02-08 PROCEDURE — 95251 CONT GLUC MNTR ANALYSIS I&R: CPT | Mod: ,,, | Performed by: NURSE PRACTITIONER

## 2022-02-08 PROCEDURE — 1159F MED LIST DOCD IN RCRD: CPT | Mod: CPTII,,, | Performed by: NURSE PRACTITIONER

## 2022-02-08 PROCEDURE — 3074F PR MOST RECENT SYSTOLIC BLOOD PRESSURE < 130 MM HG: ICD-10-PCS | Mod: CPTII,,, | Performed by: NURSE PRACTITIONER

## 2022-02-08 PROCEDURE — 1159F PR MEDICATION LIST DOCUMENTED IN MEDICAL RECORD: ICD-10-PCS | Mod: CPTII,,, | Performed by: NURSE PRACTITIONER

## 2022-02-08 PROCEDURE — 3074F SYST BP LT 130 MM HG: CPT | Mod: CPTII,,, | Performed by: NURSE PRACTITIONER

## 2022-02-08 PROCEDURE — 1160F PR REVIEW ALL MEDS BY PRESCRIBER/CLIN PHARMACIST DOCUMENTED: ICD-10-PCS | Mod: CPTII,,, | Performed by: NURSE PRACTITIONER

## 2022-02-08 PROCEDURE — 99214 OFFICE O/P EST MOD 30 MIN: CPT | Mod: PBBFAC,PN | Performed by: NURSE PRACTITIONER

## 2022-02-08 PROCEDURE — 99214 OFFICE O/P EST MOD 30 MIN: CPT | Mod: S$PBB,,, | Performed by: NURSE PRACTITIONER

## 2022-02-08 PROCEDURE — 1160F RVW MEDS BY RX/DR IN RCRD: CPT | Mod: CPTII,,, | Performed by: NURSE PRACTITIONER

## 2022-02-08 PROCEDURE — 3044F PR MOST RECENT HEMOGLOBIN A1C LEVEL <7.0%: ICD-10-PCS | Mod: CPTII,,, | Performed by: NURSE PRACTITIONER

## 2022-02-08 PROCEDURE — 3008F BODY MASS INDEX DOCD: CPT | Mod: CPTII,,, | Performed by: NURSE PRACTITIONER

## 2022-02-08 RX ORDER — INSULIN ASPART 100 [IU]/ML
INJECTION, SOLUTION INTRAVENOUS; SUBCUTANEOUS
Qty: 6 EACH | Refills: 11 | Status: SHIPPED | OUTPATIENT
Start: 2022-02-08 | End: 2022-06-30 | Stop reason: SDUPTHER

## 2022-02-08 NOTE — ASSESSMENT & PLAN NOTE
Body mass index is 31.16 kg/m².  Increases insulin resistance.   Discussed DM diet and exercise.

## 2022-02-08 NOTE — ASSESSMENT & PLAN NOTE
A1c is at goal   Rarely having hypoglycemia   + dietary indiscretions. -but working on diet -- prandial excursions.   Some insulin pump site issues -- discussed alternative site options  Much better control with the personal C GM (dexcom) and the Tandem pump as it has control IQ         -- Medication Changes: Continue Tandem pump with Novolog  Remain in Control IQ     Pump settings: continue current pump settings.   Basal:  MN-6AM: 2.35 units/hr   MN-Noon: 1.75 units/hr   Noon-MN: 2 units/hr       ICR:   MN-6AM: 1: 6.5 grams   MN-Noon: 1: 6.5 grams   Noon-MN: 1: 4.5 grams       ISF:   MN-6AM: 1: 20   MN-Noon: 1: 20-- consider tightening if needed at RTC or between visits   Noon-MN: 1: 16      Target:    MN-6AM: 120  MN-Noon: 120  Noon-MN: 120    IOB: 3 hours -- 5 hours when in control IQ     -- Reviewed goals of therapy are to get the best control we can without hypoglycemia.  -- Reviewed patient's current insulin regimen. Clarified proper insulin dose and timing in relation to meals, etc. Insulin injection sites and proper rotation instructed.   -- Advised frequent self blood glucose monitoring.  Patient encouraged to document glucose results and bring them to every clinic visit. - continue to use Dexcom -- supplies come from [employer]   -- Hypoglycemia precautions discussed. Instructed on precautions before driving.    -- Call for Bg repeatedly < 70 or > 180.   -- Close adherence to lifestyle changes recommended.   -- Periodic follow ups for eye evaluations, foot care and dental care suggested.    -- patient is scheduled to get his eye exam done next week in [place]--- I have asked him to send me the records when he goes     Patient requires a therapeutic CGM and is willing to use therapeutic CGM/SMBG for Necessary frequent adjustments in insulin therapy. Patient is using SMBG for glucose monitoring (4 + times a day).  Patient is having Hypoglycemia and Hypoglycemia Unawareness of <50mg/dl.   I have assessed  adherence to CGM regimen and to the plan.

## 2022-02-08 NOTE — PROGRESS NOTES
CC:   Chief Complaint   Patient presents with    Diabetes Mellitus       HPI: Jonathon Daniel is a 26 y.o. male presents for a follow up visit today for the management of T1DM.      He  was diagnosed with Type 1 diabetes in 2009 (14 years old)when he went into DKA with BG reading of 1108. Started on insulin therapy at the time of diagnosis.   He started with the Omnipod within a year of diagnosis of T1DM. He started the Medtronic 670G in 2018. He has also worn the [place] pump and Medtronic 670G pump in the past.   He previously wore a Dexcom personal CGM- G4 and he really liked it.   He does not care for the Medtronic 670G pump or the Guardian sensor.  Reports that he was never able to get the guardian sensor to work right and has never been able to be in auto mode on the 670G.  He is now on the Dexcom G6.  He started the Tandem pump in June 2021.     Family hx of diabetes: uncle- T1DM- JOSE ROBERTO   Hospitalized for diabetes: a couple of visits to the ER for hyperglycemia   DKA at diagnosis   In 2020- DKA - pump malfunction     No personal or FH of thyroid cancer or personal of pancreatic cancer or pancreatitis.     He knows that he is due for an eye exam in plans to get one down in [place] next week -- ask that he message me to get his eye records.     Our last visit was in October of 2021  At that visit we made insulin pump setting adjustments  His A1c has remained at 6.6%  Overall doing well  Does report a couple of insulin pump site issues where the 2 being will be pulled at work and his site will go bad and cause hyperglycemia  He is asking about the Omnipod 5- that will communicate with the Dexcom---this has not gone live yet      DIABETES COMPLICATIONS: none      Diabetes Management Status    ASA:  No    Statin: Not taking  ACE/ARB: Not taking    Screening or Prevention Patient's value Goal Complete/Controlled?   HgA1C Testing and Control   Lab Results   Component Value Date    HGBA1C 6.6 (H) 02/03/2022       Annually/Less than 8% No   Lipid profile : 04/19/2021 Annually No   LDL control Lab Results   Component Value Date    LDLCALC 90 04/19/2021    Annually/Less than 100 mg/dl  No   Nephropathy screening Lab Results   Component Value Date    LABMICR 11.0 09/24/2018     Lab Results   Component Value Date    PROTEINUA Negative 04/13/2021    Annually No   Blood pressure BP Readings from Last 1 Encounters:   02/08/22 122/78    Less than 140/90 Yes   Dilated retinal exam Most Recent Eye Exam Date: Not Found - external- [place] -- scheduled next week  Annually Yes   Foot exam   : 04/14/2021 Annually Yes       CURRENT A1C:    Hemoglobin A1C   Date Value Ref Range Status   02/03/2022 6.6 (H) <5.7 % of total Hgb Final     Comment:     For someone without known diabetes, a hemoglobin A1c  value of 6.5% or greater indicates that they may have   diabetes and this should be confirmed with a follow-up   test.     For someone with known diabetes, a value <7% indicates   that their diabetes is well controlled and a value   greater than or equal to 7% indicates suboptimal   control. A1c targets should be individualized based on   duration of diabetes, age, comorbid conditions, and   other considerations.     Currently, no consensus exists regarding use of  hemoglobin A1c for diagnosis of diabetes for children.         10/15/2021 6.6 (H) <5.7 % of total Hgb Final     Comment:     For someone without known diabetes, a hemoglobin A1c  value of 6.5% or greater indicates that they may have   diabetes and this should be confirmed with a follow-up   test.     For someone with known diabetes, a value <7% indicates   that their diabetes is well controlled and a value   greater than or equal to 7% indicates suboptimal   control. A1c targets should be individualized based on   duration of diabetes, age, comorbid conditions, and   other considerations.     Currently, no consensus exists regarding use of  hemoglobin A1c for diagnosis of diabetes  for children.         2021 8.8 (H) <5.7 % of total Hgb Final     Comment:     For someone without known diabetes, a hemoglobin A1c  value of 6.5% or greater indicates that they may have   diabetes and this should be confirmed with a follow-up   test.     For someone with known diabetes, a value <7% indicates   that their diabetes is well controlled and a value   greater than or equal to 7% indicates suboptimal   control. A1c targets should be individualized based on   duration of diabetes, age, comorbid conditions, and   other considerations.     Currently, no consensus exists regarding use of  hemoglobin A1c for diagnosis of diabetes for children.             GOAL A1C: 6.5% without hypoglycemia     DM MEDICATIONS USED IN THE PAST: Medtronic 670G pump   Omnipod   [place]   Dexcom G4   Levemir   Lantus   Novolog   Humalog   Dexcom G6   Tandem pump     CURRENT DIABETES MEDICATIONS: Insulin Pump:  Tandem pump with Novolog  In control IQ    Pump settings:  Basal:  MN-6AM: 2.35 units/hr   MN-Noon: 1.75 units/hr   Noon-MN: 2 units/hr       ICR:   MN-6AM: 1: 6.5 grams   MN-Noon: 1: 6.5 grams   Noon-MN: 1: 4.5 grams       ISF:   MN-6AM: 1: 20   MN-Noon: 1: 20  Noon-MN: 1: 16      Target:    MN-6AM: 120  MN-Noon: 120  Noon-MN: 120    IOB: 3 hours -- 5 hours when in control IQ     Temp basals: none     Pump site change: q 1.5 days  Cartridge change: q 1.5 days  Insulin TDD: 137.54 units   Basal 44%   Bolus 46%, correction 5%, control IQ 5%   CHO intake: 331 grams of carbohydrates   Using the bolus wizard: yes   Overriding: N/A     Back up Lantus/Levemir:  Back up Rx at pharmacy-- Levemir     Patient's pump was downloaded in clinic today and reviewed with patient.   Please see attached documents for pump download.     Pump supplies from [employer]     BLOOD GLUCOSE MONITORING:    Sensor type: Dexcom G6   Average BG readin-164  Time in range:  64%  High- 34%   Low 1%   Site change:  q10 days    Sensor was downloaded in  clinic today and reviewed with patient.   Please see attached document for download.     Supplies from [employer]      HYPOGLYCEMIA: 1%   Has gone as low as 50's  May have hypoglycemia unawareness- difficult due to hx of autism and hx of anoxic brain injury.   Glucagon kit: [employer]   Medic alert bracelet: yes- bracelet         MEALS: eating 3-4 meals per day   Still struggling with dietary indiscretions. --high carbohydrates  He is working on his diet.   Making improvements.   Low carb bread.  Better snack options.   Drinks: diet sodas   Water with SF packets        CURRENT EXERCISE:  No formal       Review of Systems  Review of Systems   Constitutional: Negative for appetite change and fatigue.   HENT: Negative for trouble swallowing.    Eyes: Negative for visual disturbance.   Respiratory: Negative for shortness of breath.    Cardiovascular: Negative for chest pain.   Gastrointestinal: Negative for nausea.   Endocrine: Negative for polydipsia, polyphagia and polyuria.   Genitourinary:        No Nocturia    Skin: Negative for wound.   Neurological: Negative for numbness.       Physical Exam   Physical Exam  Vitals and nursing note reviewed.   Constitutional:       Appearance: He is well-developed. He is obese.   HENT:      Head: Normocephalic and atraumatic.      Right Ear: External ear normal.      Left Ear: External ear normal.      Nose: Nose normal.   Neck:      Thyroid: No thyromegaly.      Trachea: No tracheal deviation.   Cardiovascular:      Rate and Rhythm: Normal rate and regular rhythm.      Heart sounds: No murmur heard.      Pulmonary:      Effort: Pulmonary effort is normal. No respiratory distress.      Breath sounds: Normal breath sounds.   Abdominal:      Palpations: Abdomen is soft.      Tenderness: There is no abdominal tenderness.      Hernia: No hernia is present.   Musculoskeletal:      Cervical back: Normal range of motion and neck supple.   Skin:     General: Skin is warm and dry.       Capillary Refill: Capillary refill takes less than 2 seconds.      Findings: No rash.      Comments: Insulin pump sites and Dexcom sites are normal appearing. No lipo hypertropthy or atrophy     Neurological:      Mental Status: He is alert and oriented to person, place, and time.      Cranial Nerves: No cranial nerve deficit.   Psychiatric:         Behavior: Behavior normal.         Judgment: Judgment normal.         FOOT EXAMINATION: Appropriate footwear         Lab Results   Component Value Date    TSH 2.09 04/19/2021           Type 1 diabetes mellitus with hyperglycemia  A1c is at goal   Rarely having hypoglycemia   + dietary indiscretions. -but working on diet -- prandial excursions.   Some insulin pump site issues -- discussed alternative site options  Much better control with the personal C GM (dexcom) and the Tandem pump as it has control IQ         -- Medication Changes: Continue Tandem pump with Novolog  Remain in Control IQ     Pump settings: continue current pump settings.   Basal:  MN-6AM: 2.35 units/hr   MN-Noon: 1.75 units/hr   Noon-MN: 2 units/hr       ICR:   MN-6AM: 1: 6.5 grams   MN-Noon: 1: 6.5 grams   Noon-MN: 1: 4.5 grams       ISF:   MN-6AM: 1: 20   MN-Noon: 1: 20-- consider tightening if needed at RTC or between visits   Noon-MN: 1: 16      Target:    MN-6AM: 120  MN-Noon: 120  Noon-MN: 120    IOB: 3 hours -- 5 hours when in control IQ     -- Reviewed goals of therapy are to get the best control we can without hypoglycemia.  -- Reviewed patient's current insulin regimen. Clarified proper insulin dose and timing in relation to meals, etc. Insulin injection sites and proper rotation instructed.   -- Advised frequent self blood glucose monitoring.  Patient encouraged to document glucose results and bring them to every clinic visit. - continue to use Dexcom -- supplies come from [employer]   -- Hypoglycemia precautions discussed. Instructed on precautions before driving.    -- Call for Bg repeatedly  < 70 or > 180.   -- Close adherence to lifestyle changes recommended.   -- Periodic follow ups for eye evaluations, foot care and dental care suggested.    -- patient is scheduled to get his eye exam done next week in [place]--- I have asked him to send me the records when he goes     Patient requires a therapeutic CGM and is willing to use therapeutic CGM/SMBG for Necessary frequent adjustments in insulin therapy. Patient is using SMBG for glucose monitoring (4 + times a day).  Patient is having Hypoglycemia and Hypoglycemia Unawareness of <50mg/dl.   I have assessed adherence to CGM regimen and to the plan.        Insulin pump in place  See above    Class 1 obesity due to excess calories with serious comorbidity and body mass index (BMI) of 31.0 to 31.9 in adult  Body mass index is 31.16 kg/m².  Increases insulin resistance.   Discussed DM diet and exercise.       Insulin resistance  TDD of  137 units   May benefit from Metformin- can consider in the future     Autism spectrum disorder  May hinder overall diabetes management  Has strong family support         Pump backup plan    If the insulin pump is non functional and discontinued for anticipated more than 20 hours, please give daily injections of:   Long acting insulin Levemir 48 units daily   Short acting insulin Novolog 1:6  for meals according to carb ratios and sensitivity factor in the pump.     When the insulin pump is restarted, do not restart basal rates until at least 22 hours after the last long acting insulin injection. You can set a 0% temporary basal setting that will last until this time and use your pump to bolus for meals and correction.     For any technical insulin pump issues, please contact the insulin pump company; the toll free number is printed on the label on the back of the insulin pump.       If your sugar is running high for a few hours and does not respond to two correction doses from the insulin pump, it may mean that you have a  bad pump site and the site should be changed         Follow up in about 4 months (around 6/8/2022).   Labs at [facility] prior       Orders Placed This Encounter   Procedures    HIV 1/2 Ag/Ab (4th Gen)     Standing Status:   Future     Number of Occurrences:   1     Standing Expiration Date:   8/8/2023     Order Specific Question:   Release to patient     Answer:   Immediate    Hepatitis C Antibody     Standing Status:   Future     Number of Occurrences:   1     Standing Expiration Date:   8/8/2023     Order Specific Question:   Release to patient     Answer:   Immediate    Hemoglobin A1C     Standing Status:   Future     Number of Occurrences:   1     Standing Expiration Date:   8/8/2023    Comprehensive Metabolic Panel     Standing Status:   Future     Number of Occurrences:   1     Standing Expiration Date:   8/8/2023    Lipid Panel     Standing Status:   Future     Number of Occurrences:   1     Standing Expiration Date:   8/8/2023    Microalbumin/Creatinine Ratio, Urine     Standing Status:   Future     Number of Occurrences:   1     Standing Expiration Date:   8/8/2023     Order Specific Question:   Specimen Source     Answer:   Urine    TSH     Standing Status:   Future     Number of Occurrences:   1     Standing Expiration Date:   8/8/2023    CBC Auto Differential     Standing Status:   Future     Number of Occurrences:   1     Standing Expiration Date:   8/8/2023       Recommendations were discussed with the patient in detail  The patient verbalized understanding and agrees with the plan outlined as above.     This note was partly generated with [employer] voice recognition software. I apologize for any possible typographical errors.

## 2022-04-08 LAB
LEFT EYE DM RETINOPATHY: NEGATIVE
RIGHT EYE DM RETINOPATHY: NEGATIVE

## 2022-04-18 ENCOUNTER — PATIENT OUTREACH (OUTPATIENT)
Dept: ADMINISTRATIVE | Facility: HOSPITAL | Age: 27
End: 2022-04-18
Payer: MEDICAID

## 2022-04-28 ENCOUNTER — PATIENT MESSAGE (OUTPATIENT)
Dept: PRIMARY CARE CLINIC | Facility: CLINIC | Age: 27
End: 2022-04-28
Payer: MEDICAID

## 2022-06-29 LAB
ALBUMIN SERPL-MCNC: 4.6 G/DL (ref 3.6–5.1)
ALBUMIN/CREAT UR: 4 MCG/MG CREAT
ALBUMIN/GLOB SERPL: 1.9 (CALC) (ref 1–2.5)
ALP SERPL-CCNC: 62 U/L (ref 36–130)
ALT SERPL-CCNC: 14 U/L (ref 9–46)
AST SERPL-CCNC: 13 U/L (ref 10–40)
BASOPHILS # BLD AUTO: 28 CELLS/UL (ref 0–200)
BASOPHILS NFR BLD AUTO: 0.5 %
BILIRUB SERPL-MCNC: 0.6 MG/DL (ref 0.2–1.2)
BLASTS # BLD: NORMAL CELLS/UL
BLASTS NFR BLD MANUAL: NORMAL %
BUN SERPL-MCNC: 17 MG/DL (ref 7–25)
BUN/CREAT SERPL: ABNORMAL (CALC) (ref 6–22)
CALCIUM SERPL-MCNC: 9.4 MG/DL (ref 8.6–10.3)
CHLORIDE SERPL-SCNC: 106 MMOL/L (ref 98–110)
CHOLEST SERPL-MCNC: 150 MG/DL
CHOLEST/HDLC SERPL: 4.8 (CALC)
CO2 SERPL-SCNC: 27 MMOL/L (ref 20–32)
CREAT SERPL-MCNC: 0.95 MG/DL (ref 0.6–1.35)
CREAT UR-MCNC: 255 MG/DL (ref 20–320)
EOSINOPHIL # BLD AUTO: 67 CELLS/UL (ref 15–500)
EOSINOPHIL NFR BLD AUTO: 1.2 %
ERYTHROCYTE [DISTWIDTH] IN BLOOD BY AUTOMATED COUNT: 12.2 % (ref 11–15)
GLOBULIN SER CALC-MCNC: 2.4 G/DL (CALC) (ref 1.9–3.7)
GLUCOSE SERPL-MCNC: 133 MG/DL (ref 65–99)
HBA1C MFR BLD: 6.7 % OF TOTAL HGB
HCT VFR BLD AUTO: 45.8 % (ref 38.5–50)
HCV AB S/CO SERPL IA: 0.01
HCV AB SERPL QL IA: NORMAL
HDLC SERPL-MCNC: 31 MG/DL
HGB BLD-MCNC: 15.7 G/DL (ref 13.2–17.1)
HIV 1+2 AB+HIV1 P24 AG SERPL QL IA: NORMAL
LDLC SERPL CALC-MCNC: 97 MG/DL (CALC)
LYMPHOCYTES # BLD AUTO: 2078 CELLS/UL (ref 850–3900)
LYMPHOCYTES NFR BLD AUTO: 37.1 %
MCH RBC QN AUTO: 29.8 PG (ref 27–33)
MCHC RBC AUTO-ENTMCNC: 34.3 G/DL (ref 32–36)
MCV RBC AUTO: 87.1 FL (ref 80–100)
METAMYELOCYTES # BLD: NORMAL CELLS/UL
METAMYELOCYTES NFR BLD MANUAL: NORMAL %
MICROALBUMIN UR-MCNC: 0.9 MG/DL
MONOCYTES # BLD AUTO: 459 CELLS/UL (ref 200–950)
MONOCYTES NFR BLD AUTO: 8.2 %
MYELOCYTES # BLD: NORMAL CELLS/UL
MYELOCYTES NFR BLD MANUAL: NORMAL %
NEUTROPHILS # BLD AUTO: 2968 CELLS/UL (ref 1500–7800)
NEUTROPHILS NFR BLD AUTO: 53 %
NEUTS BAND # BLD: NORMAL CELLS/UL (ref 0–750)
NEUTS BAND NFR BLD MANUAL: NORMAL %
NONHDLC SERPL-MCNC: 119 MG/DL (CALC)
NRBC # BLD: NORMAL CELLS/UL
NRBC BLD-RTO: NORMAL /100 WBC
PLATELET # BLD AUTO: 227 THOUSAND/UL (ref 140–400)
PMV BLD REES-ECKER: 9.8 FL (ref 7.5–12.5)
POTASSIUM SERPL-SCNC: 4.4 MMOL/L (ref 3.5–5.3)
PROMYELOCYTES # BLD: NORMAL CELLS/UL
PROMYELOCYTES NFR BLD MANUAL: NORMAL %
PROT SERPL-MCNC: 7 G/DL (ref 6.1–8.1)
RBC # BLD AUTO: 5.26 MILLION/UL (ref 4.2–5.8)
SERVICE CMNT-IMP: NORMAL
SODIUM SERPL-SCNC: 140 MMOL/L (ref 135–146)
TRIGL SERPL-MCNC: 120 MG/DL
TSH SERPL-ACNC: 1.87 MIU/L (ref 0.4–4.5)
VARIANT LYMPHS NFR BLD: NORMAL % (ref 0–10)
WBC # BLD AUTO: 5.6 THOUSAND/UL (ref 3.8–10.8)

## 2022-06-30 ENCOUNTER — TELEPHONE (OUTPATIENT)
Dept: DIABETES | Facility: CLINIC | Age: 27
End: 2022-06-30
Payer: MEDICAID

## 2022-06-30 ENCOUNTER — OFFICE VISIT (OUTPATIENT)
Dept: DIABETES | Facility: CLINIC | Age: 27
End: 2022-06-30
Payer: MEDICAID

## 2022-06-30 VITALS
WEIGHT: 236 LBS | TEMPERATURE: 99 F | OXYGEN SATURATION: 98 % | SYSTOLIC BLOOD PRESSURE: 128 MMHG | DIASTOLIC BLOOD PRESSURE: 78 MMHG | HEART RATE: 81 BPM | HEIGHT: 71 IN | BODY MASS INDEX: 33.04 KG/M2

## 2022-06-30 DIAGNOSIS — Z71.9 HEALTH EDUCATION/COUNSELING: ICD-10-CM

## 2022-06-30 DIAGNOSIS — Z46.81 INSULIN PUMP FITTING OR ADJUSTMENT: ICD-10-CM

## 2022-06-30 DIAGNOSIS — E88.819 INSULIN RESISTANCE: ICD-10-CM

## 2022-06-30 DIAGNOSIS — E66.09 CLASS 1 OBESITY DUE TO EXCESS CALORIES WITH SERIOUS COMORBIDITY AND BODY MASS INDEX (BMI) OF 32.0 TO 32.9 IN ADULT: ICD-10-CM

## 2022-06-30 DIAGNOSIS — F90.2 ATTENTION DEFICIT HYPERACTIVITY DISORDER, COMBINED TYPE: ICD-10-CM

## 2022-06-30 DIAGNOSIS — F84.0 AUTISM SPECTRUM DISORDER: ICD-10-CM

## 2022-06-30 DIAGNOSIS — E10.65 TYPE 1 DIABETES MELLITUS WITH HYPERGLYCEMIA: Primary | ICD-10-CM

## 2022-06-30 DIAGNOSIS — Z96.41 INSULIN PUMP IN PLACE: ICD-10-CM

## 2022-06-30 PROCEDURE — 99999 PR PBB SHADOW E&M-EST. PATIENT-LVL IV: ICD-10-PCS | Mod: PBBFAC,,, | Performed by: NURSE PRACTITIONER

## 2022-06-30 PROCEDURE — 3078F DIAST BP <80 MM HG: CPT | Mod: CPTII,,, | Performed by: NURSE PRACTITIONER

## 2022-06-30 PROCEDURE — 99215 OFFICE O/P EST HI 40 MIN: CPT | Mod: S$PBB,,, | Performed by: NURSE PRACTITIONER

## 2022-06-30 PROCEDURE — 95251 CONT GLUC MNTR ANALYSIS I&R: CPT | Mod: ,,, | Performed by: NURSE PRACTITIONER

## 2022-06-30 PROCEDURE — 1159F PR MEDICATION LIST DOCUMENTED IN MEDICAL RECORD: ICD-10-PCS | Mod: CPTII,,, | Performed by: NURSE PRACTITIONER

## 2022-06-30 PROCEDURE — 3008F PR BODY MASS INDEX (BMI) DOCUMENTED: ICD-10-PCS | Mod: CPTII,,, | Performed by: NURSE PRACTITIONER

## 2022-06-30 PROCEDURE — 3044F PR MOST RECENT HEMOGLOBIN A1C LEVEL <7.0%: ICD-10-PCS | Mod: CPTII,,, | Performed by: NURSE PRACTITIONER

## 2022-06-30 PROCEDURE — 1160F PR REVIEW ALL MEDS BY PRESCRIBER/CLIN PHARMACIST DOCUMENTED: ICD-10-PCS | Mod: CPTII,,, | Performed by: NURSE PRACTITIONER

## 2022-06-30 PROCEDURE — 3066F PR DOCUMENTATION OF TREATMENT FOR NEPHROPATHY: ICD-10-PCS | Mod: CPTII,,, | Performed by: NURSE PRACTITIONER

## 2022-06-30 PROCEDURE — 99215 PR OFFICE/OUTPT VISIT, EST, LEVL V, 40-54 MIN: ICD-10-PCS | Mod: S$PBB,,, | Performed by: NURSE PRACTITIONER

## 2022-06-30 PROCEDURE — 95251 PR GLUCOSE MONITOR, 72 HOUR, PHYS INTERP: ICD-10-PCS | Mod: ,,, | Performed by: NURSE PRACTITIONER

## 2022-06-30 PROCEDURE — 99999 PR PBB SHADOW E&M-EST. PATIENT-LVL IV: CPT | Mod: PBBFAC,,, | Performed by: NURSE PRACTITIONER

## 2022-06-30 PROCEDURE — 3078F PR MOST RECENT DIASTOLIC BLOOD PRESSURE < 80 MM HG: ICD-10-PCS | Mod: CPTII,,, | Performed by: NURSE PRACTITIONER

## 2022-06-30 PROCEDURE — 3008F BODY MASS INDEX DOCD: CPT | Mod: CPTII,,, | Performed by: NURSE PRACTITIONER

## 2022-06-30 PROCEDURE — 1160F RVW MEDS BY RX/DR IN RCRD: CPT | Mod: CPTII,,, | Performed by: NURSE PRACTITIONER

## 2022-06-30 PROCEDURE — 3044F HG A1C LEVEL LT 7.0%: CPT | Mod: CPTII,,, | Performed by: NURSE PRACTITIONER

## 2022-06-30 PROCEDURE — 3074F PR MOST RECENT SYSTOLIC BLOOD PRESSURE < 130 MM HG: ICD-10-PCS | Mod: CPTII,,, | Performed by: NURSE PRACTITIONER

## 2022-06-30 PROCEDURE — 1159F MED LIST DOCD IN RCRD: CPT | Mod: CPTII,,, | Performed by: NURSE PRACTITIONER

## 2022-06-30 PROCEDURE — 99214 OFFICE O/P EST MOD 30 MIN: CPT | Mod: PBBFAC,PN | Performed by: NURSE PRACTITIONER

## 2022-06-30 PROCEDURE — 3061F PR NEG MICROALBUMINURIA RESULT DOCUMENTED/REVIEW: ICD-10-PCS | Mod: CPTII,,, | Performed by: NURSE PRACTITIONER

## 2022-06-30 PROCEDURE — 3061F NEG MICROALBUMINURIA REV: CPT | Mod: CPTII,,, | Performed by: NURSE PRACTITIONER

## 2022-06-30 PROCEDURE — 3074F SYST BP LT 130 MM HG: CPT | Mod: CPTII,,, | Performed by: NURSE PRACTITIONER

## 2022-06-30 PROCEDURE — 3066F NEPHROPATHY DOC TX: CPT | Mod: CPTII,,, | Performed by: NURSE PRACTITIONER

## 2022-06-30 RX ORDER — INSULIN ASPART 100 [IU]/ML
INJECTION, SOLUTION INTRAVENOUS; SUBCUTANEOUS
Qty: 6 EACH | Refills: 11 | Status: SHIPPED | OUTPATIENT
Start: 2022-06-30 | End: 2023-01-18 | Stop reason: SDUPTHER

## 2022-06-30 NOTE — PATIENT INSTRUCTIONS
Pump backup plan    If the insulin pump is non functional and discontinued for anticipated more than 20 hours, please give daily injections of:   Long acting insulin Levemir  28 units BID   Short acting insulin Novolog 1:5-1:6 for meals according to carb ratios and sensitivity factor in the pump.     When the insulin pump is restarted, do not restart basal rates until at least 22 hours after the last long acting insulin injection. You can set a 0% temporary basal setting that will last until this time and use your pump to bolus for meals and correction.     For any technical insulin pump issues, please contact the insulin pump company; the toll free number is printed on the label on the back of the insulin pump.       If your sugar is running high for a few hours and does not respond to two correction doses from the insulin pump, it may mean that you have a bad pump site and the site should be changed

## 2022-06-30 NOTE — PROGRESS NOTES
CC:   Chief Complaint   Patient presents with    Diabetes Mellitus       HPI: Jonathon Daniel is a 26 y.o. male presents for a follow up visit today for the management of T1DM.      He  was diagnosed with Type 1 diabetes in 2009 (14 years old)when he went into DKA with BG reading of 1108. Started on insulin therapy at the time of diagnosis.   He started with the Omnipod within a year of diagnosis of T1DM. He started the Medtronic 670G in 2018. He has also worn the [place] pump and Medtronic 670G pump in the past.   He previously wore a Dexcom personal CGM- G4 and he really liked it.   He does not care for the Medtronic 670G pump or the Guardian sensor.  Reports that he was never able to get the guardian sensor to work right and has never been able to be in auto mode on the 670G.  He is now on the Dexcom G6.  He started the Tandem pump in June 2021.     Family hx of diabetes: uncle- T1DM- JOSE ROBERTO   Hospitalized for diabetes: a couple of visits to the ER for hyperglycemia   DKA at diagnosis   In 2020- DKA - pump malfunction     No personal or FH of thyroid cancer or personal of pancreatic cancer or pancreatitis.     He knows that he is due for an eye exam in plans to get one down in [place] next week -- ask that he message me to get his eye records.       Our last visit was in February of 2022  At that visit we continued his current insulin pump settings.  Discussed insulin pump site issues and discussed alternate site options.  We reviewed dietary indiscretions.  He did mention at that visit that he might be interested in the Omnipod 5 in the future.  The Omnipod 5 did release earlier this month--cut back on basal rates overnight he wants to hold off right now would like to stay on the tandem pump at least until the end of this year  His A1c has remained well controlled at 6.7%  On download he is having prandial excursions--he feels he needs a tighter carbohydrate ratio  Issues where correction scale is not bring down  his blood sugar readings--needs a tighter corrections  See attached downloads      DIABETES COMPLICATIONS: none      Diabetes Management Status    ASA:  No    Statin: Not taking  ACE/ARB: Not taking    Screening or Prevention Patient's value Goal Complete/Controlled?   HgA1C Testing and Control   Lab Results   Component Value Date    HGBA1C 6.7 (H) 06/28/2022      Annually/Less than 8% No   Lipid profile : 06/28/2022 Annually No   LDL control Lab Results   Component Value Date    LDLCALC 97 06/28/2022    Annually/Less than 100 mg/dl  No   Nephropathy screening Lab Results   Component Value Date    LABMICR 11.0 09/24/2018     Lab Results   Component Value Date    PROTEINUA Negative 04/13/2021    Annually No   Blood pressure BP Readings from Last 1 Encounters:   06/30/22 128/78    Less than 140/90 Yes   Dilated retinal exam : 04/08/2022 - external- [place] --  Annually Yes   Foot exam   : 06/30/2022 Annually Yes       CURRENT A1C:    Hemoglobin A1C   Date Value Ref Range Status   06/28/2022 6.7 (H) <5.7 % of total Hgb Final     Comment:     For someone without known diabetes, a hemoglobin A1c  value of 6.5% or greater indicates that they may have   diabetes and this should be confirmed with a follow-up   test.     For someone with known diabetes, a value <7% indicates   that their diabetes is well controlled and a value   greater than or equal to 7% indicates suboptimal   control. A1c targets should be individualized based on   duration of diabetes, age, comorbid conditions, and   other considerations.     Currently, no consensus exists regarding use of  hemoglobin A1c for diagnosis of diabetes for children.         02/03/2022 6.6 (H) <5.7 % of total Hgb Final     Comment:     For someone without known diabetes, a hemoglobin A1c  value of 6.5% or greater indicates that they may have   diabetes and this should be confirmed with a follow-up   test.     For someone with known diabetes, a value <7% indicates   that their  diabetes is well controlled and a value   greater than or equal to 7% indicates suboptimal   control. A1c targets should be individualized based on   duration of diabetes, age, comorbid conditions, and   other considerations.     Currently, no consensus exists regarding use of  hemoglobin A1c for diagnosis of diabetes for children.         10/15/2021 6.6 (H) <5.7 % of total Hgb Final     Comment:     For someone without known diabetes, a hemoglobin A1c  value of 6.5% or greater indicates that they may have   diabetes and this should be confirmed with a follow-up   test.     For someone with known diabetes, a value <7% indicates   that their diabetes is well controlled and a value   greater than or equal to 7% indicates suboptimal   control. A1c targets should be individualized based on   duration of diabetes, age, comorbid conditions, and   other considerations.     Currently, no consensus exists regarding use of  hemoglobin A1c for diagnosis of diabetes for children.             GOAL A1C: 6.5% without hypoglycemia     DM MEDICATIONS USED IN THE PAST: Medtronic 670G pump   Omnipod   Fontana   Dexcom G4   Levemir   Lantus   Novolog   Humalog   Dexcom G6   Tandem pump     CURRENT DIABETES MEDICATIONS: Insulin Pump:  Tandem pump with Novolog  In control IQ    Pump settings:  Basal:  MN-6AM: 2.35 units/hr   MN-Noon: 1.75 units/hr   Noon-MN: 2 units/hr       ICR:   MN-6AM: 1: 6.5 grams   6Am-Noon: 1: 6.5 grams   Noon-MN: 1: 4.5 grams       ISF:   MN-6AM: 1: 20   MN-Noon: 1: 20  Noon-MN: 1: 16      Target:    MN-6AM: 120  MN-Noon: 120  Noon-MN: 120    IOB: 3 hours -- 5 hours when in control IQ     Temp basals: none     Pump site change: q 1.5 days  Cartridge change: q 1.5 days  Insulin TDD: 121.94 units   Basal 45%   Bolus 36%, correction 14%, control IQ 5%   CHO intake: 268 grams of carbohydrates ---he denies entering in false carbs  Using the bolus wizard: yes   Overriding: N/A     Back up Lantus/Levemir:  Back up Rx at  pharmacy-- Levemir     Patient's pump was downloaded in clinic today and reviewed with patient.   Please see attached documents for pump download.     Pump supplies from [place]     BLOOD GLUCOSE MONITORING:    Sensor type: Dexcom G6   Average BG readin-162  Time in range:  62%  High- 37%   Low 1%   Site change:  q10 days    Sensor was downloaded in clinic today and reviewed with patient.   Please see attached document for download.     Supplies from [place]      HYPOGLYCEMIA: 1%   Has gone as low as 50's  May have hypoglycemia unawareness- difficult due to hx of autism and hx of anoxic brain injury.   Glucagon kit: [employer]   Medic alert bracelet: yes- bracelet         MEALS: eating 3-4 meals per day   Still struggling with dietary indiscretions. --high carbohydrates-  He is working on his diet.   Making improvements.   Low carb bread.  Better snack options.   Drinks: diet sodas   Water with SF packets        CURRENT EXERCISE:  No formal       Review of Systems  Review of Systems   Constitutional: Negative for appetite change and fatigue.   HENT: Negative for trouble swallowing.    Eyes: Negative for visual disturbance.   Respiratory: Negative for shortness of breath.    Cardiovascular: Negative for chest pain.   Gastrointestinal: Negative for nausea.   Endocrine: Negative for polydipsia, polyphagia and polyuria.   Genitourinary:        No Nocturia    Skin: Negative for wound.   Neurological: Negative for numbness.       Physical Exam   Physical Exam  Vitals and nursing note reviewed.   Constitutional:       Appearance: He is well-developed. He is obese.   HENT:      Head: Normocephalic and atraumatic.      Right Ear: External ear normal.      Left Ear: External ear normal.      Nose: Nose normal.   Neck:      Thyroid: No thyromegaly.      Trachea: No tracheal deviation.   Cardiovascular:      Rate and Rhythm: Normal rate and regular rhythm.      Heart sounds: No murmur heard.  Pulmonary:      Effort:  Pulmonary effort is normal. No respiratory distress.      Breath sounds: Normal breath sounds.   Abdominal:      Palpations: Abdomen is soft.      Tenderness: There is no abdominal tenderness.      Hernia: No hernia is present.   Musculoskeletal:      Cervical back: Normal range of motion and neck supple.   Skin:     General: Skin is warm and dry.      Capillary Refill: Capillary refill takes less than 2 seconds.      Findings: No rash.      Comments: Insulin pump sites and Dexcom sites are normal appearing. No lipo hypertropthy or atrophy     Neurological:      Mental Status: He is alert and oriented to person, place, and time.      Cranial Nerves: No cranial nerve deficit.   Psychiatric:         Behavior: Behavior normal.         Judgment: Judgment normal.         FOOT EXAMINATION: Appropriate footwear     Protective Sensation (w/ 10 gram monofilament):  Right: Intact  Left: Intact    Visual Inspection:  Normal -  Bilateral and Nails Intact - without Evidence of Foot Deformity- Bilateral    Pedal Pulses:   Right: Present  Left: Present    Posterior tibialis:   Right:Present  Left: Present          Lab Results   Component Value Date    TSH 1.87 06/28/2022           Type 1 diabetes mellitus with hyperglycemia  A1c is at goal   Rarely having hypoglycemia   + dietary indiscretions. -but working on diet -- prandial excursions.  Much better control with the personal C GM (dexcom) and the Tandem pump as it has control IQ   He may want to change to the Omnipod 5 in the future as it is tubeless       -- Medication Changes: Continue Tandem pump with Novolog  Remain in Control IQ     Pump settings: continue current pump settings.   Basal:  Continue basal rates  MN-6AM: 2.35 units/hr   MN-Noon: 1.75 units/hr   Noon-MN: 2 units/hr       ICR:  Tightened carb ratio  MN-6AM: 1: 6.5 grams   6Am-Noon: 1: 6 grams   Noon--4 PM- 1:6 grams  4PM- MN: 1: 4 grams       ISF:  Tightened correction  MN-6AM: 1: 20  6Am-Noon: 1: 15  Noon--4  PM- 1:15   4PM- MN: 1: 12       Target:    MN-6AM: 120  MN-Noon: 120  Noon-MN: 120    IOB: 3 hours -- 5 hours when in control IQ     -- Reviewed goals of therapy are to get the best control we can without hypoglycemia.  -- Reviewed patient's current insulin regimen. Clarified proper insulin dose and timing in relation to meals, etc. Insulin injection sites and proper rotation instructed.   -- Advised frequent self blood glucose monitoring.  Patient encouraged to document glucose results and bring them to every clinic visit. - continue to use Dexcom -- supplies come from [facility]   -- Hypoglycemia precautions discussed. Instructed on precautions before driving.    -- Call for Bg repeatedly < 70 or > 180.   -- Close adherence to lifestyle changes recommended.   -- Periodic follow ups for eye evaluations, foot care and dental care suggested.        Patient requires a therapeutic CGM and is willing to use therapeutic CGM/SMBG for Necessary frequent adjustments in insulin therapy. Patient is using SMBG for glucose monitoring (4 + times a day).  Patient is having Hypoglycemia and Hypoglycemia Unawareness of <50mg/dl.   I have assessed adherence to CGM regimen and to the plan.        Insulin pump in place  See above    Insulin pump fitting or adjustment  See above     Class 1 obesity due to excess calories with serious comorbidity and body mass index (BMI) of 32.0 to 32.9 in adult  Body mass index is 32.92 kg/m².  Increases insulin resistance.   Discussed DM diet and exercise.       Insulin resistance  TDD of  110-130 units   May benefit from Metformin- can consider in the future     Autism spectrum disorder  May hinder overall diabetes management  Has strong family support         Pump backup plan    If the insulin pump is non functional and discontinued for anticipated more than 20 hours, please give daily injections of:   Long acting insulin Levemir  28 units BID   Short acting insulin Novolog 1:5-1:6 for meals  according to carb ratios and sensitivity factor in the pump.     When the insulin pump is restarted, do not restart basal rates until at least 22 hours after the last long acting insulin injection. You can set a 0% temporary basal setting that will last until this time and use your pump to bolus for meals and correction.     For any technical insulin pump issues, please contact the insulin pump company; the toll free number is printed on the label on the back of the insulin pump.       If your sugar is running high for a few hours and does not respond to two correction doses from the insulin pump, it may mean that you have a bad pump site and the site should be changed         Follow up in about 6 months (around 12/30/2022).   Labs at [facility] prior       Orders Placed This Encounter   Procedures    Hemoglobin A1C     Standing Status:   Future     Number of Occurrences:   1     Standing Expiration Date:   1/1/2024    Basic Metabolic Panel     Standing Status:   Future     Number of Occurrences:   1     Standing Expiration Date:   1/1/2024       Recommendations were discussed with the patient in detail  The patient verbalized understanding and agrees with the plan outlined as above.     This note was partly generated with [employer] voice recognition software. I apologize for any possible typographical errors.

## 2022-07-01 NOTE — ASSESSMENT & PLAN NOTE
A1c is at goal   Rarely having hypoglycemia   + dietary indiscretions. -but working on diet -- prandial excursions.  Much better control with the personal C GM (dexcom) and the Tandem pump as it has control IQ   He may want to change to the Omnipod 5 in the future as it is tubeless       -- Medication Changes: Continue Tandem pump with Novolog  Remain in Control IQ     Pump settings: continue current pump settings.   Basal:  Continue basal rates  MN-6AM: 2.35 units/hr   MN-Noon: 1.75 units/hr   Noon-MN: 2 units/hr       ICR:  Tightened carb ratio  MN-6AM: 1: 6.5 grams   6Am-Noon: 1: 6 grams   Noon--4 PM- 1:6 grams  4PM- MN: 1: 4 grams       ISF:  Tightened correction  MN-6AM: 1: 20  6Am-Noon: 1: 15  Noon--4 PM- 1:15   4PM- MN: 1: 12       Target:    MN-6AM: 120  MN-Noon: 120  Noon-MN: 120    IOB: 3 hours -- 5 hours when in control IQ     -- Reviewed goals of therapy are to get the best control we can without hypoglycemia.  -- Reviewed patient's current insulin regimen. Clarified proper insulin dose and timing in relation to meals, etc. Insulin injection sites and proper rotation instructed.   -- Advised frequent self blood glucose monitoring.  Patient encouraged to document glucose results and bring them to every clinic visit. - continue to use Dexcom -- supplies come from [place]   -- Hypoglycemia precautions discussed. Instructed on precautions before driving.    -- Call for Bg repeatedly < 70 or > 180.   -- Close adherence to lifestyle changes recommended.   -- Periodic follow ups for eye evaluations, foot care and dental care suggested.        Patient requires a therapeutic CGM and is willing to use therapeutic CGM/SMBG for Necessary frequent adjustments in insulin therapy. Patient is using SMBG for glucose monitoring (4 + times a day).  Patient is having Hypoglycemia and Hypoglycemia Unawareness of <50mg/dl.   I have assessed adherence to CGM regimen and to the plan.

## 2022-07-01 NOTE — ASSESSMENT & PLAN NOTE
Body mass index is 32.92 kg/m².  Increases insulin resistance.   Discussed DM diet and exercise.

## 2022-09-27 ENCOUNTER — PATIENT MESSAGE (OUTPATIENT)
Dept: PRIMARY CARE CLINIC | Facility: CLINIC | Age: 27
End: 2022-09-27
Payer: MEDICAID

## 2022-11-02 ENCOUNTER — TELEPHONE (OUTPATIENT)
Dept: DIABETES | Facility: CLINIC | Age: 27
End: 2022-11-02
Payer: MEDICAID

## 2023-01-18 ENCOUNTER — OFFICE VISIT (OUTPATIENT)
Dept: DIABETES | Facility: CLINIC | Age: 28
End: 2023-01-18
Payer: MEDICAID

## 2023-01-18 VITALS
DIASTOLIC BLOOD PRESSURE: 80 MMHG | OXYGEN SATURATION: 98 % | HEART RATE: 73 BPM | BODY MASS INDEX: 33.67 KG/M2 | HEIGHT: 71 IN | SYSTOLIC BLOOD PRESSURE: 130 MMHG | WEIGHT: 240.5 LBS

## 2023-01-18 DIAGNOSIS — Z46.81 INSULIN PUMP FITTING OR ADJUSTMENT: ICD-10-CM

## 2023-01-18 DIAGNOSIS — E88.819 INSULIN RESISTANCE: ICD-10-CM

## 2023-01-18 DIAGNOSIS — E66.09 CLASS 1 OBESITY DUE TO EXCESS CALORIES WITH SERIOUS COMORBIDITY AND BODY MASS INDEX (BMI) OF 33.0 TO 33.9 IN ADULT: ICD-10-CM

## 2023-01-18 DIAGNOSIS — E10.65 TYPE 1 DIABETES MELLITUS WITH HYPERGLYCEMIA: Primary | ICD-10-CM

## 2023-01-18 DIAGNOSIS — Z71.9 HEALTH EDUCATION/COUNSELING: ICD-10-CM

## 2023-01-18 DIAGNOSIS — Z96.41 INSULIN PUMP IN PLACE: ICD-10-CM

## 2023-01-18 DIAGNOSIS — F84.0 AUTISM SPECTRUM DISORDER: ICD-10-CM

## 2023-01-18 LAB
BUN SERPL-MCNC: 22 MG/DL (ref 7–25)
BUN/CREAT SERPL: ABNORMAL (CALC) (ref 6–22)
CALCIUM SERPL-MCNC: 9 MG/DL (ref 8.6–10.3)
CHLORIDE SERPL-SCNC: 106 MMOL/L (ref 98–110)
CO2 SERPL-SCNC: 22 MMOL/L (ref 20–32)
CREAT SERPL-MCNC: 0.9 MG/DL (ref 0.6–1.24)
EGFR: 121 ML/MIN/1.73M2
GLUCOSE SERPL-MCNC: 173 MG/DL (ref 65–99)
HBA1C MFR BLD: 6.6 % OF TOTAL HGB
POTASSIUM SERPL-SCNC: 4.3 MMOL/L (ref 3.5–5.3)
SODIUM SERPL-SCNC: 138 MMOL/L (ref 135–146)

## 2023-01-18 PROCEDURE — 3008F PR BODY MASS INDEX (BMI) DOCUMENTED: ICD-10-PCS | Mod: CPTII,,, | Performed by: NURSE PRACTITIONER

## 2023-01-18 PROCEDURE — 95251 CONT GLUC MNTR ANALYSIS I&R: CPT | Mod: ,,, | Performed by: NURSE PRACTITIONER

## 2023-01-18 PROCEDURE — 3008F BODY MASS INDEX DOCD: CPT | Mod: CPTII,,, | Performed by: NURSE PRACTITIONER

## 2023-01-18 PROCEDURE — 95251 PR GLUCOSE MONITOR, 72 HOUR, PHYS INTERP: ICD-10-PCS | Mod: ,,, | Performed by: NURSE PRACTITIONER

## 2023-01-18 PROCEDURE — 3044F PR MOST RECENT HEMOGLOBIN A1C LEVEL <7.0%: ICD-10-PCS | Mod: CPTII,,, | Performed by: NURSE PRACTITIONER

## 2023-01-18 PROCEDURE — 99214 OFFICE O/P EST MOD 30 MIN: CPT | Mod: S$PBB,,, | Performed by: NURSE PRACTITIONER

## 2023-01-18 PROCEDURE — 3079F DIAST BP 80-89 MM HG: CPT | Mod: CPTII,,, | Performed by: NURSE PRACTITIONER

## 2023-01-18 PROCEDURE — 1160F PR REVIEW ALL MEDS BY PRESCRIBER/CLIN PHARMACIST DOCUMENTED: ICD-10-PCS | Mod: CPTII,,, | Performed by: NURSE PRACTITIONER

## 2023-01-18 PROCEDURE — 99999 PR PBB SHADOW E&M-EST. PATIENT-LVL III: ICD-10-PCS | Mod: PBBFAC,,, | Performed by: NURSE PRACTITIONER

## 2023-01-18 PROCEDURE — 3079F PR MOST RECENT DIASTOLIC BLOOD PRESSURE 80-89 MM HG: ICD-10-PCS | Mod: CPTII,,, | Performed by: NURSE PRACTITIONER

## 2023-01-18 PROCEDURE — 3075F PR MOST RECENT SYSTOLIC BLOOD PRESS GE 130-139MM HG: ICD-10-PCS | Mod: CPTII,,, | Performed by: NURSE PRACTITIONER

## 2023-01-18 PROCEDURE — 1160F RVW MEDS BY RX/DR IN RCRD: CPT | Mod: CPTII,,, | Performed by: NURSE PRACTITIONER

## 2023-01-18 PROCEDURE — 3044F HG A1C LEVEL LT 7.0%: CPT | Mod: CPTII,,, | Performed by: NURSE PRACTITIONER

## 2023-01-18 PROCEDURE — 3075F SYST BP GE 130 - 139MM HG: CPT | Mod: CPTII,,, | Performed by: NURSE PRACTITIONER

## 2023-01-18 PROCEDURE — 99213 OFFICE O/P EST LOW 20 MIN: CPT | Mod: PBBFAC,PN | Performed by: NURSE PRACTITIONER

## 2023-01-18 PROCEDURE — 99214 PR OFFICE/OUTPT VISIT, EST, LEVL IV, 30-39 MIN: ICD-10-PCS | Mod: S$PBB,,, | Performed by: NURSE PRACTITIONER

## 2023-01-18 PROCEDURE — 99999 PR PBB SHADOW E&M-EST. PATIENT-LVL III: CPT | Mod: PBBFAC,,, | Performed by: NURSE PRACTITIONER

## 2023-01-18 PROCEDURE — 1159F PR MEDICATION LIST DOCUMENTED IN MEDICAL RECORD: ICD-10-PCS | Mod: CPTII,,, | Performed by: NURSE PRACTITIONER

## 2023-01-18 PROCEDURE — 1159F MED LIST DOCD IN RCRD: CPT | Mod: CPTII,,, | Performed by: NURSE PRACTITIONER

## 2023-01-18 RX ORDER — INSULIN ASPART 100 [IU]/ML
INJECTION, SOLUTION INTRAVENOUS; SUBCUTANEOUS
Qty: 6 EACH | Refills: 11 | Status: SHIPPED | OUTPATIENT
Start: 2023-01-18 | End: 2023-09-15 | Stop reason: SDUPTHER

## 2023-01-18 NOTE — PROGRESS NOTES
CC:   Chief Complaint   Patient presents with    Diabetes Mellitus       HPI: Jonathon Daniel is a 27 y.o. male presents for a follow up visit today for the management of T1DM.      He  was diagnosed with Type 1 diabetes in 2009 (14 years old)when he went into DKA with BG reading of 1108. Started on insulin therapy at the time of diagnosis.   He started with the Omnipod within a year of diagnosis of T1DM. He started the Medtronic 670G in 2018. He has also worn the [place] pump and Medtronic 670G pump in the past.   He previously wore a Dexcom personal CGM- G4 and he really liked it.   He does not care for the Medtronic 670G pump or the Guardian sensor.  Reports that he was never able to get the guardian sensor to work right and has never been able to be in auto mode on the 670G.  He is now on the Dexcom G6.  He started the Tandem pump in June 2021.     Family hx of diabetes: uncle- T1DM- JOSE ROBERTO   Hospitalized for diabetes: a couple of visits to the ER for hyperglycemia   DKA at diagnosis   In 2020- DKA - pump malfunction     No personal or FH of thyroid cancer or personal of pancreatic cancer or pancreatitis.       Our last visit visit was in June 2022  Presents with mom   Still on Tandem insulin pump + dexcom in control IQ   Has had some issues -- tubing getting caught while working, pump site dislodgement, can't find a good insertion site so pump doesn't getting pulled  Annoyed with dexcom alarms going off overnight for high alerts. Adjusted dexcom alarms/ alerts in clinic today and showed patient how to do it as well.   Making patient agitated  He wants to change to Omnipod 5 -- reached out to Omnipod rep and was told his insurance is not approving Omnipod 5 for T1DM at this time.   On Tandem pump- in control IQ- BG readings are well controlled  See attached data   A1c 6.6%     DIABETES COMPLICATIONS: none      Diabetes Management Status    ASA:  No    Statin: Not taking  ACE/ARB: Not taking    Screening or Prevention  Patient's value Goal Complete/Controlled?   HgA1C Testing and Control   Lab Results   Component Value Date    HGBA1C 6.6 (H) 01/17/2023      Annually/Less than 8% No   Lipid profile : 06/28/2022 Annually No   LDL control Lab Results   Component Value Date    LDLCALC 97 06/28/2022    Annually/Less than 100 mg/dl  No   Nephropathy screening Lab Results   Component Value Date    LABMICR 11.0 09/24/2018     Lab Results   Component Value Date    PROTEINUA Negative 04/13/2021    Annually No   Blood pressure BP Readings from Last 1 Encounters:   01/18/23 130/80    Less than 140/90 Yes   Dilated retinal exam : 04/08/2022 - external- [place] --  Annually Yes   Foot exam   : 06/30/2022 Annually Yes       CURRENT A1C:    Hemoglobin A1C   Date Value Ref Range Status   01/17/2023 6.6 (H) <5.7 % of total Hgb Final     Comment:     For someone without known diabetes, a hemoglobin A1c  value of 6.5% or greater indicates that they may have   diabetes and this should be confirmed with a follow-up   test.     For someone with known diabetes, a value <7% indicates   that their diabetes is well controlled and a value   greater than or equal to 7% indicates suboptimal   control. A1c targets should be individualized based on   duration of diabetes, age, comorbid conditions, and   other considerations.     Currently, no consensus exists regarding use of  hemoglobin A1c for diagnosis of diabetes for children.         06/28/2022 6.7 (H) <5.7 % of total Hgb Final     Comment:     For someone without known diabetes, a hemoglobin A1c  value of 6.5% or greater indicates that they may have   diabetes and this should be confirmed with a follow-up   test.     For someone with known diabetes, a value <7% indicates   that their diabetes is well controlled and a value   greater than or equal to 7% indicates suboptimal   control. A1c targets should be individualized based on   duration of diabetes, age, comorbid conditions, and   other  considerations.     Currently, no consensus exists regarding use of  hemoglobin A1c for diagnosis of diabetes for children.         2022 6.6 (H) <5.7 % of total Hgb Final     Comment:     For someone without known diabetes, a hemoglobin A1c  value of 6.5% or greater indicates that they may have   diabetes and this should be confirmed with a follow-up   test.     For someone with known diabetes, a value <7% indicates   that their diabetes is well controlled and a value   greater than or equal to 7% indicates suboptimal   control. A1c targets should be individualized based on   duration of diabetes, age, comorbid conditions, and   other considerations.     Currently, no consensus exists regarding use of  hemoglobin A1c for diagnosis of diabetes for children.             GOAL A1C: 6.5% without hypoglycemia     DM MEDICATIONS USED IN THE PAST: Medtronic 670G pump   Omnipod   [place]   Dexcom G4   Levemir   Lantus   Novolog   Humalog   Dexcom G6   Tandem pump     CURRENT DIABETES MEDICATIONS: Insulin Pump:  Tandem pump with Novolog  In control IQ    Pump settings:  Basal:  MN-6AM: 2.35 units/hr   MN-Noon: 1.75 units/hr   Noon-MN: 2 units/hr     ICR:  Tightened carb ratio  MN-6AM: 1: 6.5 grams   6Am-Noon: 1: 6 grams   Noon--4 PM- 1:4.5 grams  4PM- MN: 1: 4 grams         ISF:  Tightened correction  MN-6AM: 1: 20  6Am-Noon: 1: 15  Noon--4 PM- 1:15   4PM- MN: 1: 12         Target:    MN-6AM: 120  MN-Noon: 120  Noon-MN: 120       IOB: 3 hours -- 5 hours when in control IQ     Temp basals: none     Pump site change: q 1.6 days  Cartridge change: q 1.6 days  Insulin TDD: 119.82 units   Basal 44%   Bolus 39%, correction 16%,   CHO intake: 252 grams of carbohydrates ---he denies entering in false carbs  Using the bolus wizard: yes   [place]in%     Back up Lantus/Levemir:  Back up Rx at pharmacy-- Levemir     Patient's pump was downloaded in clinic today and reviewed with patient.   Please see attached documents for pump  download.     Pump supplies from [employer]     BLOOD GLUCOSE MONITORING:    Sensor type: Dexcom G6   Average BG readin-179  Time in range:  65%--71%  High- 27%   Low 3%   Site change:  q10 days    Sensor was downloaded in clinic today and reviewed with patient.   Please see attached document for download.     Supplies from [employer]      HYPOGLYCEMIA: 0%-3%   Has gone as low as 50's  May have hypoglycemia unawareness- difficult due to hx of autism and hx of anoxic brain injury.   Glucagon kit: [employer]   Medic alert bracelet: yes- bracelet         MEALS: eating 3-4 meals per day   Still struggling with dietary indiscretions. --high carbohydrates-250+ grams of CHO per day   Drinks: diet sodas   Water with SF packets        CURRENT EXERCISE:  No formal       Review of Systems  Review of Systems   Constitutional:  Negative for appetite change and fatigue.   HENT:  Negative for trouble swallowing.    Eyes:  Negative for visual disturbance.   Respiratory:  Negative for shortness of breath.    Cardiovascular:  Negative for chest pain.   Gastrointestinal:  Negative for nausea.   Endocrine: Negative for polydipsia, polyphagia and polyuria.   Genitourinary:         No Nocturia    Skin:  Negative for wound.   Neurological:  Negative for numbness.     Physical Exam   Physical Exam  Vitals and nursing note reviewed.   Constitutional:       Appearance: He is well-developed. He is obese.   HENT:      Head: Normocephalic and atraumatic.      Right Ear: External ear normal.      Left Ear: External ear normal.      Nose: Nose normal.   Neck:      Thyroid: No thyromegaly.      Trachea: No tracheal deviation.   Cardiovascular:      Rate and Rhythm: Normal rate and regular rhythm.      Heart sounds: No murmur heard.  Pulmonary:      Effort: Pulmonary effort is normal. No respiratory distress.      Breath sounds: Normal breath sounds.   Abdominal:      Palpations: Abdomen is soft.      Tenderness: There is no abdominal tenderness.       Hernia: No hernia is present.   Musculoskeletal:      Cervical back: Normal range of motion and neck supple.   Skin:     General: Skin is warm and dry.      Capillary Refill: Capillary refill takes less than 2 seconds.      Findings: No rash.      Comments: Insulin pump sites and Dexcom sites are normal appearing. No lipo hypertropthy or atrophy     Neurological:      Mental Status: He is alert and oriented to person, place, and time.      Cranial Nerves: No cranial nerve deficit.   Psychiatric:         Behavior: Behavior normal.         Judgment: Judgment normal.       FOOT EXAMINATION: Appropriate footwear         Lab Results   Component Value Date    TSH 1.87 06/28/2022           Type 1 diabetes mellitus with hyperglycemia  A1c is at goal   Rarely having hypoglycemia   + dietary indiscretions. -but working on diet -- prandial excursions.  Much better control with the personal C GM (dexcom) and the Tandem pump as it has control IQ   He wants to change to the Omnipod 5  as it is tubeless-- he frustrated with tubed pump-- family is trying to make him more independent--having a job on his own -- we reached out to the Omnipod rep and he is unable to switch at this time due to insurance- his insurance is not covering the Omnipod 5 at this time     Dexcom alarms adjusted in insulin pump to prevent alarm fatigue and frustration      -- Medication Changes: Continue Tandem pump with Novolog  Remain in Control IQ     Pump settings: continue current pump settings.   Basal:  Continue basal rates  MN-6AM: 2.35 units/hr   MN-Noon: 1.75 units/hr   Noon-MN: 2 units/hr       ICR:    MN-6AM: 1: 6.5 grams   6Am-Noon: 1: 6 grams   Noon--4 PM- 1:4.5 grams  4PM- MN: 1: 4 grams       ISF: tightened correction     MN-6AM: 1: 20  6Am-Noon: 1: 12  Noon--4 PM- 1:12   4PM- MN: 1: 12       Target:    MN-6AM: 120  MN-Noon: 120  Noon-MN: 120    IOB: 3 hours -- 5 hours when in control IQ     -- Reviewed goals of therapy are to get the best  control we can without hypoglycemia.  -- Reviewed patient's current insulin regimen. Clarified proper insulin dose and timing in relation to meals, etc. Insulin injection sites and proper rotation instructed.   -- Advised frequent self blood glucose monitoring.  Patient encouraged to document glucose results and bring them to every clinic visit. - continue to use Dexcom -- supplies come from "[employer]   -- Hypoglycemia precautions discussed. Instructed on precautions before driving.    -- Call for Bg repeatedly < 70 or > 200.   -- Close adherence to lifestyle changes recommended.   -- Periodic follow ups for eye evaluations, foot care and dental care suggested.        Patient requires a therapeutic CGM and is willing to use therapeutic CGM/SMBG for Necessary frequent adjustments in insulin therapy. Patient is using SMBG for glucose monitoring (4 + times a day).  Patient is having Hypoglycemia and Hypoglycemia Unawareness of <50mg/dl.   I have assessed adherence to CGM regimen and to the plan.        Insulin pump in place  See above     Insulin pump fitting or adjustment  See above     Class 1 obesity due to excess calories with serious comorbidity and body mass index (BMI) of 33.0 to 33.9 in adult  Body mass index is 33.54 kg/m².  Increases insulin resistance.   Discussed DM diet and exercise.       Insulin resistance  TDD of  110-130 units   May benefit from Metformin- can consider in the future     Autism spectrum disorder  May hinder overall diabetes management  Has strong family support         Pump backup plan    If the insulin pump is non functional and discontinued for anticipated more than 20 hours, please give daily injections of:   Long acting insulin Levemir  28 units BID   Short acting insulin Novolog 1:5-1:6 for meals according to carb ratios and sensitivity factor in the pump.     When the insulin pump is restarted, do not restart basal rates until at least 22 hours after the last long acting insulin  injection. You can set a 0% temporary basal setting that will last until this time and use your pump to bolus for meals and correction.     For any technical insulin pump issues, please contact the insulin pump company; the toll free number is printed on the label on the back of the insulin pump.       If your sugar is running high for a few hours and does not respond to two correction doses from the insulin pump, it may mean that you have a bad pump site and the site should be changed         Follow up in about 6 months (around 7/18/2023).   Labs at [facility] prior       Orders Placed This Encounter   Procedures    Comprehensive Metabolic Panel     Standing Status:   Future     Number of Occurrences:   1     Standing Expiration Date:   7/20/2024    CBC Auto Differential     Standing Status:   Future     Number of Occurrences:   1     Standing Expiration Date:   7/20/2024    Hemoglobin A1C     Standing Status:   Future     Number of Occurrences:   1     Standing Expiration Date:   7/20/2024    Lipid Panel     Standing Status:   Future     Number of Occurrences:   1     Standing Expiration Date:   7/20/2024    TSH     Standing Status:   Future     Number of Occurrences:   1     Standing Expiration Date:   7/20/2024    Microalbumin/Creatinine Ratio, Urine     Standing Status:   Future     Number of Occurrences:   1     Standing Expiration Date:   7/20/2024     Order Specific Question:   Specimen Source     Answer:   Urine       Recommendations were discussed with the patient in detail  The patient verbalized understanding and agrees with the plan outlined as above.     This note was partly generated with [employer] voice recognition software. I apologize for any possible typographical errors.

## 2023-01-20 NOTE — ASSESSMENT & PLAN NOTE
A1c is at goal   Rarely having hypoglycemia   + dietary indiscretions. -but working on diet -- prandial excursions.  Much better control with the personal C GM (dexcom) and the Tandem pump as it has control IQ   He wants to change to the Omnipod 5  as it is tubeless-- he frustrated with tubed pump-- family is trying to make him more independent--having a job on his own -- we reached out to the Omnipod rep and he is unable to switch at this time due to insurance- his insurance is not covering the Omnipod 5 at this time     Dexcom alarms adjusted in insulin pump to prevent alarm fatigue and frustration      -- Medication Changes: Continue Tandem pump with Novolog  Remain in Control IQ     Pump settings: continue current pump settings.   Basal:  Continue basal rates  MN-6AM: 2.35 units/hr   MN-Noon: 1.75 units/hr   Noon-MN: 2 units/hr       ICR:    MN-6AM: 1: 6.5 grams   6Am-Noon: 1: 6 grams   Noon--4 PM- 1:4.5 grams  4PM- MN: 1: 4 grams       ISF: tightened correction     MN-6AM: 1: 20  6Am-Noon: 1: 12  Noon--4 PM- 1:12   4PM- MN: 1: 12       Target:    MN-6AM: 120  MN-Noon: 120  Noon-MN: 120    IOB: 3 hours -- 5 hours when in control IQ     -- Reviewed goals of therapy are to get the best control we can without hypoglycemia.  -- Reviewed patient's current insulin regimen. Clarified proper insulin dose and timing in relation to meals, etc. Insulin injection sites and proper rotation instructed.   -- Advised frequent self blood glucose monitoring.  Patient encouraged to document glucose results and bring them to every clinic visit. - continue to use Dexcom -- supplies come from [employer]   -- Hypoglycemia precautions discussed. Instructed on precautions before driving.    -- Call for Bg repeatedly < 70 or > 200.   -- Close adherence to lifestyle changes recommended.   -- Periodic follow ups for eye evaluations, foot care and dental care suggested.        Patient requires a therapeutic CGM and is willing to use  therapeutic CGM/SMBG for Necessary frequent adjustments in insulin therapy. Patient is using SMBG for glucose monitoring (4 + times a day).  Patient is having Hypoglycemia and Hypoglycemia Unawareness of <50mg/dl.   I have assessed adherence to CGM regimen and to the plan.

## 2023-01-20 NOTE — ASSESSMENT & PLAN NOTE
Body mass index is 33.54 kg/m².  Increases insulin resistance.   Discussed DM diet and exercise.

## 2023-04-12 ENCOUNTER — TELEPHONE (OUTPATIENT)
Dept: DIABETES | Facility: CLINIC | Age: 28
End: 2023-04-12
Payer: MEDICAID

## 2023-09-14 LAB
ALBUMIN SERPL-MCNC: 4.9 G/DL (ref 3.6–5.1)
ALBUMIN/CREAT UR: 6 MCG/MG CREAT
ALBUMIN/GLOB SERPL: 2 (CALC) (ref 1–2.5)
ALP SERPL-CCNC: 80 U/L (ref 36–130)
ALT SERPL-CCNC: 12 U/L (ref 9–46)
AST SERPL-CCNC: 13 U/L (ref 10–40)
BASOPHILS # BLD AUTO: 41 CELLS/UL (ref 0–200)
BASOPHILS NFR BLD AUTO: 0.7 %
BILIRUB SERPL-MCNC: 0.8 MG/DL (ref 0.2–1.2)
BLASTS # BLD: NORMAL CELLS/UL
BLASTS NFR BLD MANUAL: NORMAL %
BUN SERPL-MCNC: 21 MG/DL (ref 7–25)
BUN/CREAT SERPL: ABNORMAL (CALC) (ref 6–22)
CALCIUM SERPL-MCNC: 9.7 MG/DL (ref 8.6–10.3)
CHLORIDE SERPL-SCNC: 102 MMOL/L (ref 98–110)
CHOLEST SERPL-MCNC: 172 MG/DL
CHOLEST/HDLC SERPL: 4.9 (CALC)
CO2 SERPL-SCNC: 24 MMOL/L (ref 20–32)
CREAT SERPL-MCNC: 1.03 MG/DL (ref 0.6–1.24)
CREAT UR-MCNC: 127 MG/DL (ref 20–320)
EGFR: 103 ML/MIN/1.73M2
EOSINOPHIL # BLD AUTO: 52 CELLS/UL (ref 15–500)
EOSINOPHIL NFR BLD AUTO: 0.9 %
ERYTHROCYTE [DISTWIDTH] IN BLOOD BY AUTOMATED COUNT: 12.3 % (ref 11–15)
GLOBULIN SER CALC-MCNC: 2.4 G/DL (CALC) (ref 1.9–3.7)
GLUCOSE SERPL-MCNC: 320 MG/DL (ref 65–99)
HBA1C MFR BLD: 7.6 % OF TOTAL HGB
HCT VFR BLD AUTO: 46.4 % (ref 38.5–50)
HDLC SERPL-MCNC: 35 MG/DL
HGB BLD-MCNC: 15.7 G/DL (ref 13.2–17.1)
LDLC SERPL CALC-MCNC: 114 MG/DL (CALC)
LYMPHOCYTES # BLD AUTO: 1630 CELLS/UL (ref 850–3900)
LYMPHOCYTES NFR BLD AUTO: 28.1 %
MCH RBC QN AUTO: 30.1 PG (ref 27–33)
MCHC RBC AUTO-ENTMCNC: 33.8 G/DL (ref 32–36)
MCV RBC AUTO: 89.1 FL (ref 80–100)
METAMYELOCYTES # BLD: NORMAL CELLS/UL
METAMYELOCYTES NFR BLD MANUAL: NORMAL %
MICROALBUMIN UR-MCNC: 0.8 MG/DL
MONOCYTES # BLD AUTO: 412 CELLS/UL (ref 200–950)
MONOCYTES NFR BLD AUTO: 7.1 %
MYELOCYTES # BLD: NORMAL CELLS/UL
MYELOCYTES NFR BLD MANUAL: NORMAL %
NEUTROPHILS # BLD AUTO: 3666 CELLS/UL (ref 1500–7800)
NEUTROPHILS NFR BLD AUTO: 63.2 %
NEUTS BAND # BLD: NORMAL CELLS/UL (ref 0–750)
NEUTS BAND NFR BLD MANUAL: NORMAL %
NONHDLC SERPL-MCNC: 137 MG/DL (CALC)
NRBC # BLD: NORMAL CELLS/UL
NRBC BLD-RTO: NORMAL /100 WBC
PLATELET # BLD AUTO: 217 THOUSAND/UL (ref 140–400)
PMV BLD REES-ECKER: 10.4 FL (ref 7.5–12.5)
POTASSIUM SERPL-SCNC: 4.4 MMOL/L (ref 3.5–5.3)
PROMYELOCYTES # BLD: NORMAL CELLS/UL
PROMYELOCYTES NFR BLD MANUAL: NORMAL %
PROT SERPL-MCNC: 7.3 G/DL (ref 6.1–8.1)
RBC # BLD AUTO: 5.21 MILLION/UL (ref 4.2–5.8)
SERVICE CMNT-IMP: NORMAL
SODIUM SERPL-SCNC: 135 MMOL/L (ref 135–146)
TRIGL SERPL-MCNC: 123 MG/DL
TSH SERPL-ACNC: 2.04 MIU/L (ref 0.4–4.5)
VARIANT LYMPHS NFR BLD: NORMAL % (ref 0–10)
WBC # BLD AUTO: 5.8 THOUSAND/UL (ref 3.8–10.8)

## 2023-09-15 ENCOUNTER — OFFICE VISIT (OUTPATIENT)
Dept: DIABETES | Facility: CLINIC | Age: 28
End: 2023-09-15
Payer: MEDICAID

## 2023-09-15 ENCOUNTER — TELEPHONE (OUTPATIENT)
Dept: DIABETES | Facility: CLINIC | Age: 28
End: 2023-09-15

## 2023-09-15 VITALS
BODY MASS INDEX: 33.57 KG/M2 | HEART RATE: 86 BPM | HEIGHT: 71 IN | DIASTOLIC BLOOD PRESSURE: 79 MMHG | SYSTOLIC BLOOD PRESSURE: 121 MMHG | OXYGEN SATURATION: 98 % | WEIGHT: 239.81 LBS

## 2023-09-15 DIAGNOSIS — Z96.41 INSULIN PUMP IN PLACE: ICD-10-CM

## 2023-09-15 DIAGNOSIS — Z46.81 INSULIN PUMP FITTING OR ADJUSTMENT: ICD-10-CM

## 2023-09-15 DIAGNOSIS — E10.65 TYPE 1 DIABETES MELLITUS WITH HYPERGLYCEMIA: Primary | ICD-10-CM

## 2023-09-15 DIAGNOSIS — E66.09 CLASS 1 OBESITY DUE TO EXCESS CALORIES WITH SERIOUS COMORBIDITY AND BODY MASS INDEX (BMI) OF 33.0 TO 33.9 IN ADULT: ICD-10-CM

## 2023-09-15 DIAGNOSIS — Z71.9 HEALTH EDUCATION/COUNSELING: ICD-10-CM

## 2023-09-15 DIAGNOSIS — E88.819 INSULIN RESISTANCE: ICD-10-CM

## 2023-09-15 DIAGNOSIS — F84.0 AUTISM SPECTRUM DISORDER: ICD-10-CM

## 2023-09-15 PROCEDURE — 3074F SYST BP LT 130 MM HG: CPT | Mod: CPTII,,, | Performed by: NURSE PRACTITIONER

## 2023-09-15 PROCEDURE — 3061F NEG MICROALBUMINURIA REV: CPT | Mod: CPTII,,, | Performed by: NURSE PRACTITIONER

## 2023-09-15 PROCEDURE — 1160F PR REVIEW ALL MEDS BY PRESCRIBER/CLIN PHARMACIST DOCUMENTED: ICD-10-PCS | Mod: CPTII,,, | Performed by: NURSE PRACTITIONER

## 2023-09-15 PROCEDURE — 3008F BODY MASS INDEX DOCD: CPT | Mod: CPTII,,, | Performed by: NURSE PRACTITIONER

## 2023-09-15 PROCEDURE — 3074F PR MOST RECENT SYSTOLIC BLOOD PRESSURE < 130 MM HG: ICD-10-PCS | Mod: CPTII,,, | Performed by: NURSE PRACTITIONER

## 2023-09-15 PROCEDURE — 1159F PR MEDICATION LIST DOCUMENTED IN MEDICAL RECORD: ICD-10-PCS | Mod: CPTII,,, | Performed by: NURSE PRACTITIONER

## 2023-09-15 PROCEDURE — 95251 PR GLUCOSE MONITOR, 72 HOUR, PHYS INTERP: ICD-10-PCS | Mod: ,,, | Performed by: NURSE PRACTITIONER

## 2023-09-15 PROCEDURE — 99214 OFFICE O/P EST MOD 30 MIN: CPT | Mod: S$PBB,,, | Performed by: NURSE PRACTITIONER

## 2023-09-15 PROCEDURE — 99999 PR PBB SHADOW E&M-EST. PATIENT-LVL IV: CPT | Mod: PBBFAC,,, | Performed by: NURSE PRACTITIONER

## 2023-09-15 PROCEDURE — 99214 PR OFFICE/OUTPT VISIT, EST, LEVL IV, 30-39 MIN: ICD-10-PCS | Mod: S$PBB,,, | Performed by: NURSE PRACTITIONER

## 2023-09-15 PROCEDURE — 3051F PR MOST RECENT HEMOGLOBIN A1C LEVEL 7.0 - < 8.0%: ICD-10-PCS | Mod: CPTII,,, | Performed by: NURSE PRACTITIONER

## 2023-09-15 PROCEDURE — 3066F NEPHROPATHY DOC TX: CPT | Mod: CPTII,,, | Performed by: NURSE PRACTITIONER

## 2023-09-15 PROCEDURE — 3078F PR MOST RECENT DIASTOLIC BLOOD PRESSURE < 80 MM HG: ICD-10-PCS | Mod: CPTII,,, | Performed by: NURSE PRACTITIONER

## 2023-09-15 PROCEDURE — 95251 CONT GLUC MNTR ANALYSIS I&R: CPT | Mod: ,,, | Performed by: NURSE PRACTITIONER

## 2023-09-15 PROCEDURE — 3008F PR BODY MASS INDEX (BMI) DOCUMENTED: ICD-10-PCS | Mod: CPTII,,, | Performed by: NURSE PRACTITIONER

## 2023-09-15 PROCEDURE — 1159F MED LIST DOCD IN RCRD: CPT | Mod: CPTII,,, | Performed by: NURSE PRACTITIONER

## 2023-09-15 PROCEDURE — 1160F RVW MEDS BY RX/DR IN RCRD: CPT | Mod: CPTII,,, | Performed by: NURSE PRACTITIONER

## 2023-09-15 PROCEDURE — 99214 OFFICE O/P EST MOD 30 MIN: CPT | Mod: PBBFAC,PN | Performed by: NURSE PRACTITIONER

## 2023-09-15 PROCEDURE — 3061F PR NEG MICROALBUMINURIA RESULT DOCUMENTED/REVIEW: ICD-10-PCS | Mod: CPTII,,, | Performed by: NURSE PRACTITIONER

## 2023-09-15 PROCEDURE — 3078F DIAST BP <80 MM HG: CPT | Mod: CPTII,,, | Performed by: NURSE PRACTITIONER

## 2023-09-15 PROCEDURE — 99999 PR PBB SHADOW E&M-EST. PATIENT-LVL IV: ICD-10-PCS | Mod: PBBFAC,,, | Performed by: NURSE PRACTITIONER

## 2023-09-15 PROCEDURE — 3066F PR DOCUMENTATION OF TREATMENT FOR NEPHROPATHY: ICD-10-PCS | Mod: CPTII,,, | Performed by: NURSE PRACTITIONER

## 2023-09-15 PROCEDURE — 3051F HG A1C>EQUAL 7.0%<8.0%: CPT | Mod: CPTII,,, | Performed by: NURSE PRACTITIONER

## 2023-09-15 RX ORDER — INSULIN PMP CART,AUT,G6/7,CNTR
1 EACH SUBCUTANEOUS DAILY
Qty: 1 EACH | Refills: 0 | Status: SHIPPED | OUTPATIENT
Start: 2023-09-15 | End: 2023-12-28

## 2023-09-15 RX ORDER — INSULIN ASPART 100 [IU]/ML
INJECTION, SOLUTION INTRAVENOUS; SUBCUTANEOUS
Qty: 6 EACH | Refills: 11 | Status: SHIPPED | OUTPATIENT
Start: 2023-09-15 | End: 2023-11-13 | Stop reason: SDUPTHER

## 2023-09-15 RX ORDER — INSULIN PMP CART,AUT,G6/7,CNTR
1 EACH SUBCUTANEOUS DAILY
Qty: 6 EACH | Refills: 11 | Status: SHIPPED | OUTPATIENT
Start: 2023-09-15

## 2023-09-15 NOTE — ASSESSMENT & PLAN NOTE
Body mass index is 33.45 kg/m².  Increases insulin resistance.   Discussed DM diet and exercise.

## 2023-09-15 NOTE — PROGRESS NOTES
CC:   Chief Complaint   Patient presents with    Diabetes Mellitus       HPI: Jonathon Daniel is a 27 y.o. male presents for a follow up visit today for the management of T1DM.      He  was diagnosed with Type 1 diabetes in 2009 (14 years old)when he went into DKA with BG reading of 1108. Started on insulin therapy at the time of diagnosis.   He started with the Omnipod within a year of diagnosis of T1DM. He started the Medtronic 670G in 2018. He has also worn the Monument pump and Medtronic 670G pump in the past.   He previously wore a Dexcom personal CGM- G4 and he really liked it.   He does not care for the Medtronic 670G pump or the Guardian sensor.  Reports that he was never able to get the guardian sensor to work right and has never been able to be in auto mode on the 670G.  He is now on the Dexcom G6.  He started the Tandem pump in June 2021.     Family hx of diabetes: uncle- T1DM- JOSE ROBERTO   Hospitalized for diabetes: a couple of visits to the ER for hyperglycemia   DKA at diagnosis   In 2020- DKA - pump malfunction     No personal or FH of thyroid cancer or personal of pancreatic cancer or pancreatitis.       Our last visit was in January 2023  He has dexocm issues which likely caused his A1c to increase   Spoke with mom on the phone   Almost 5 weeks without his dexcom sensor-- issues with his transmitter.   Sensor back on last night         DIABETES COMPLICATIONS: none      Diabetes Management Status    ASA:  No    Statin: Not taking  ACE/ARB: Not taking    Screening or Prevention Patient's value Goal Complete/Controlled?   HgA1C Testing and Control   Lab Results   Component Value Date    HGBA1C 7.6 (H) 09/13/2023      Annually/Less than 8% No   Lipid profile : 09/13/2023 Annually No   LDL control Lab Results   Component Value Date    LDLCALC 114 (H) 09/13/2023    Annually/Less than 100 mg/dl  No   Nephropathy screening Lab Results   Component Value Date    LABMICR 11.0 09/24/2018     Lab Results   Component Value  Date    PROTEINUA Negative 04/13/2021    Annually No   Blood pressure BP Readings from Last 1 Encounters:   09/15/23 121/79    Less than 140/90 Yes   Dilated retinal exam : 04/08/2022 - external- [place] --  Annually Yes   Foot exam   : 09/15/2023 Annually Yes       CURRENT A1C:    Hemoglobin A1C   Date Value Ref Range Status   09/13/2023 7.6 (H) <5.7 % of total Hgb Final     Comment:     For someone without known diabetes, a hemoglobin A1c  value of 6.5% or greater indicates that they may have   diabetes and this should be confirmed with a follow-up   test.     For someone with known diabetes, a value <7% indicates   that their diabetes is well controlled and a value   greater than or equal to 7% indicates suboptimal   control. A1c targets should be individualized based on   duration of diabetes, age, comorbid conditions, and   other considerations.     Currently, no consensus exists regarding use of  hemoglobin A1c for diagnosis of diabetes for children.         01/17/2023 6.6 (H) <5.7 % of total Hgb Final     Comment:     For someone without known diabetes, a hemoglobin A1c  value of 6.5% or greater indicates that they may have   diabetes and this should be confirmed with a follow-up   test.     For someone with known diabetes, a value <7% indicates   that their diabetes is well controlled and a value   greater than or equal to 7% indicates suboptimal   control. A1c targets should be individualized based on   duration of diabetes, age, comorbid conditions, and   other considerations.     Currently, no consensus exists regarding use of  hemoglobin A1c for diagnosis of diabetes for children.         06/28/2022 6.7 (H) <5.7 % of total Hgb Final     Comment:     For someone without known diabetes, a hemoglobin A1c  value of 6.5% or greater indicates that they may have   diabetes and this should be confirmed with a follow-up   test.     For someone with known diabetes, a value <7% indicates   that their diabetes is  well controlled and a value   greater than or equal to 7% indicates suboptimal   control. A1c targets should be individualized based on   duration of diabetes, age, comorbid conditions, and   other considerations.     Currently, no consensus exists regarding use of  hemoglobin A1c for diagnosis of diabetes for children.             GOAL A1C: 6.5% without hypoglycemia     DM MEDICATIONS USED IN THE PAST: Medtronic 670G pump   Omnipod   Colstrip   Dexcom G4   Levemir   Lantus   Novolog   Humalog   Dexcom G6   Tandem pump       CURRENT DIABETES MEDICATIONS: Insulin Pump:  Tandem pump with Novolog    Remain in Control IQ      Pump settings: continue current pump settings.   Basal:  Continue basal rates  MN-6AM: 2.35 units/hr   MN-Noon: 1.75 units/hr   Noon-MN: 2 units/hr         ICR:    MN-6AM: 1: 6.5 grams   6Am-Noon: 1: 6 grams   Noon--4 PM- 1:4.5 grams  4PM- MN: 1: 4 grams         ISF: tightened correction     MN-6AM: 1: 20  6Am-Noon: 1: 12  Noon--4 PM- 1:12   4PM- MN: 1: 12         Target:    MN-6AM: 120  MN-Noon: 120  Noon-MN: 120     IOB: 3 hours -- 5 hours when in control IQ        Temp basals: none     Pump site change: q 1.6 -2.2days  Cartridge change: q 1.6-2.2 days  Insulin TDD: 113.97 units   Basal 42%   Bolus 37%, correction 21%,   CHO intake: 258 grams of carbohydrates ---he denies entering in false carbs  Using the bolus wizard: yes   Overridin%     Back up Lantus/Levemir:  Back up Rx at pharmacy-- Levemir     Patient's pump was downloaded in clinic today and reviewed with patient.   Please see attached documents for pump download.     Pump supplies from [place]       BLOOD GLUCOSE MONITORING:    Sensor type: Dexcom G6   Average BG readin  Time in range:  18%  Site change:  q10 days    Sensor was downloaded in clinic today and reviewed with patient.   Please see attached document for download.     Supplies from [place]      HYPOGLYCEMIA: rarely   Has gone as low as 50's  May have hypoglycemia  unawareness- difficult due to hx of autism and hx of anoxic brain injury.   Glucagon kit: [employer]   Medic alert bracelet: yes- bracelet         MEALS: eating 3-4 meals per day   Still struggling with dietary indiscretions. --high carbohydrates-250+ grams of CHO per day   Drinks: diet sodas   Water with SF packets        CURRENT EXERCISE:  No formal       Review of Systems  Review of Systems   Constitutional:  Negative for appetite change and fatigue.   HENT:  Negative for trouble swallowing.    Eyes:  Negative for visual disturbance.   Respiratory:  Negative for shortness of breath.    Cardiovascular:  Negative for chest pain.   Gastrointestinal:  Negative for nausea.   Endocrine: Negative for polydipsia, polyphagia and polyuria.   Genitourinary:         No Nocturia    Skin:  Negative for wound.   Neurological:  Negative for numbness.       Physical Exam   Physical Exam  Vitals and nursing note reviewed.   Constitutional:       Appearance: He is well-developed. He is obese.   HENT:      Head: Normocephalic and atraumatic.      Right Ear: External ear normal.      Left Ear: External ear normal.      Nose: Nose normal.   Neck:      Thyroid: No thyromegaly.      Trachea: No tracheal deviation.   Cardiovascular:      Rate and Rhythm: Normal rate and regular rhythm.      Heart sounds: No murmur heard.  Pulmonary:      Effort: Pulmonary effort is normal. No respiratory distress.      Breath sounds: Normal breath sounds.   Abdominal:      Palpations: Abdomen is soft.      Tenderness: There is no abdominal tenderness.      Hernia: No hernia is present.   Musculoskeletal:      Cervical back: Normal range of motion and neck supple.   Skin:     General: Skin is warm and dry.      Capillary Refill: Capillary refill takes less than 2 seconds.      Findings: No rash.      Comments: Insulin pump sites and Dexcom sites are normal appearing. No lipo hypertropthy or atrophy     Neurological:      Mental Status: He is alert and  oriented to person, place, and time.      Cranial Nerves: No cranial nerve deficit.   Psychiatric:         Behavior: Behavior normal.         Judgment: Judgment normal.         FOOT EXAMINATION: Appropriate footwear     Protective Sensation (w/ 10 gram monofilament):  Right: Intact  Left: Intact    Visual Inspection:  Normal -  Bilateral and Nails Intact - without Evidence of Foot Deformity- Bilateral    Pedal Pulses:   Right: Present  Left: Present    Posterior Tibialis Pulses:   Right:Present  Left: Present          Lab Results   Component Value Date    TSH 2.04 09/13/2023           Type 1 diabetes mellitus with hyperglycemia  Uncontrolled   Due to issues with dexcom   Was not in control IQ due to being off the dexcom   + dietary indiscretions. -but working on diet -- prandial excursions.  Much better control with the personal C GM (dexcom) and the Tandem pump as it has control IQ   He wants to change to the Omnipod 5  as it is tubeless-- he frustrated with tubed pump-- family is trying to make him more independent--  Will try to submit for the omnipod 5-- the problem will be if they will cover changing the POD every day-- he is on a TDD of insulin ranging from 110-150 units per day--POD only hold 200 units       -- Medication Changes: Continue Tandem pump with Novolog-- will try to change to Omnipod 5   Remain in Control IQ     Pump settings: continue current pump settings.   Basal:    MN-6AM: 2.35 units/hr   MN-Noon: 1.75 units/hr   Noon-MN: 2 units/hr       ICR:    MN-6AM: 1: 6.5 grams   6Am-Noon: 1: 6 grams   Noon--4 PM- 1:4.5 grams  4PM- MN: 1: 4 grams       ISF  MN-6AM: 1: 20  6Am-Noon: 1: 12  Noon--4 PM- 1:12   4PM- MN: 1: 12       Target:    MN-6AM: 120  MN-Noon: 120  Noon-MN: 120    IOB: 3 hours -- 5 hours when in control IQ     -- Reviewed goals of therapy are to get the best control we can without hypoglycemia.  -- Reviewed patient's current insulin regimen. Clarified proper insulin dose and timing in  relation to meals, etc. Insulin injection sites and proper rotation instructed.   -- Advised frequent self blood glucose monitoring.  Patient encouraged to document glucose results and bring them to every clinic visit. - continue to use Dexcom -- supplies come from [place]   -- Hypoglycemia precautions discussed. Instructed on precautions before driving.    -- Call for Bg repeatedly < 70 or > 200.   -- Close adherence to lifestyle changes recommended.   -- Periodic follow ups for eye evaluations, foot care and dental care suggested.  -- diabetes education --- omnipod 5 start       Patient requires a therapeutic CGM and is willing to use therapeutic CGM/SMBG for Necessary frequent adjustments in insulin therapy. Patient is using SMBG for glucose monitoring (4 + times a day).  Patient is having Hypoglycemia and Hypoglycemia Unawareness of <50mg/dl.   I have assessed adherence to CGM regimen and to the plan.        Insulin pump in place  See above     Insulin pump fitting or adjustment  See above     Class 1 obesity due to excess calories with serious comorbidity and body mass index (BMI) of 33.0 to 33.9 in adult  Body mass index is 33.45 kg/m².  Increases insulin resistance.   Discussed DM diet and exercise.       Insulin resistance  TDD of  110-130 units   May benefit from Metformin- can consider in the future     Autism spectrum disorder  May hinder overall diabetes management  Has strong family support           Pump backup plan    If the insulin pump is non functional and discontinued for anticipated more than 20 hours, please give daily injections of:   Long acting insulin Levemir  28 units BID   Short acting insulin Novolog 1:5-1:6 for meals according to carb ratios and sensitivity factor in the pump.     When the insulin pump is restarted, do not restart basal rates until at least 22 hours after the last long acting insulin injection. You can set a 0% temporary basal setting that will last until this time and  use your pump to bolus for meals and correction.     For any technical insulin pump issues, please contact the insulin pump company; the toll free number is printed on the label on the back of the insulin pump.       If your sugar is running high for a few hours and does not respond to two correction doses from the insulin pump, it may mean that you have a bad pump site and the site should be changed         I spent a total of 30 minutes on the day of the visit.This includes face to face time and non-face to face time preparing to see the patient (eg, review of tests), obtaining and/or reviewing separately obtained history, documenting clinical information in the electronic or other health record, independently interpreting results and communicating results to the patient/family/caregiver, or care coordinator.        Follow up in about 3 months (around 12/15/2023).   A1c at [facility] prior       Orders Placed This Encounter   Procedures    Hemoglobin A1C     Standing Status:   Future     Number of Occurrences:   1     Standing Expiration Date:   3/15/2025    Ambulatory referral/consult to Diabetes Education     Standing Status:   Future     Standing Expiration Date:   3/15/2025     Referral Priority:   Routine     Referral Type:   Consultation     Referral Reason:   Specialty Services Required     Referred to Provider:   Inocencia Hurley, ANDRIA, CDE     Requested Specialty:   Diabetes     Number of Visits Requested:   1       Recommendations were discussed with the patient in detail  The patient verbalized understanding and agrees with the plan outlined as above.     This note was partly generated with [employer] voice recognition software. I apologize for any possible typographical errors.

## 2023-09-15 NOTE — ASSESSMENT & PLAN NOTE
Uncontrolled   Due to issues with dexcom   Was not in control IQ due to being off the dexcom   + dietary indiscretions. -but working on diet -- prandial excursions.  Much better control with the personal C GM (dexcom) and the Tandem pump as it has control IQ   He wants to change to the Omnipod 5  as it is tubeless-- he frustrated with tubed pump-- family is trying to make him more independent--  Will try to submit for the omnipod 5-- the problem will be if they will cover changing the POD every day-- he is on a TDD of insulin ranging from 110-150 units per day--POD only hold 200 units       -- Medication Changes: Continue Tandem pump with Novolog-- will try to change to Omnipod 5   Remain in Control IQ     Pump settings: continue current pump settings.   Basal:    MN-6AM: 2.35 units/hr   MN-Noon: 1.75 units/hr   Noon-MN: 2 units/hr       ICR:    MN-6AM: 1: 6.5 grams   6Am-Noon: 1: 6 grams   Noon--4 PM- 1:4.5 grams  4PM- MN: 1: 4 grams       ISF  MN-6AM: 1: 20  6Am-Noon: 1: 12  Noon--4 PM- 1:12   4PM- MN: 1: 12       Target:    MN-6AM: 120  MN-Noon: 120  Noon-MN: 120    IOB: 3 hours -- 5 hours when in control IQ     -- Reviewed goals of therapy are to get the best control we can without hypoglycemia.  -- Reviewed patient's current insulin regimen. Clarified proper insulin dose and timing in relation to meals, etc. Insulin injection sites and proper rotation instructed.   -- Advised frequent self blood glucose monitoring.  Patient encouraged to document glucose results and bring them to every clinic visit. - continue to use Dexcom -- supplies come from [place]   -- Hypoglycemia precautions discussed. Instructed on precautions before driving.    -- Call for Bg repeatedly < 70 or > 200.   -- Close adherence to lifestyle changes recommended.   -- Periodic follow ups for eye evaluations, foot care and dental care suggested.  -- diabetes education --- omnipod 5 start       Patient requires a therapeutic CGM and is willing  to use therapeutic CGM/SMBG for Necessary frequent adjustments in insulin therapy. Patient is using SMBG for glucose monitoring (4 + times a day).  Patient is having Hypoglycemia and Hypoglycemia Unawareness of <50mg/dl.   I have assessed adherence to CGM regimen and to the plan.

## 2023-09-28 ENCOUNTER — TELEPHONE (OUTPATIENT)
Dept: DIABETES | Facility: CLINIC | Age: 28
End: 2023-09-28
Payer: MEDICAID

## 2023-10-30 ENCOUNTER — PATIENT MESSAGE (OUTPATIENT)
Dept: DIABETES | Facility: CLINIC | Age: 28
End: 2023-10-30
Payer: MEDICAID

## 2023-10-30 DIAGNOSIS — E10.65 TYPE 1 DIABETES MELLITUS WITH HYPERGLYCEMIA: Primary | ICD-10-CM

## 2023-10-30 RX ORDER — BLOOD-GLUCOSE SENSOR
1 EACH MISCELLANEOUS
Qty: 3 EACH | Refills: 11 | Status: SHIPPED | OUTPATIENT
Start: 2023-10-30 | End: 2024-10-29

## 2023-10-30 RX ORDER — BLOOD-GLUCOSE TRANSMITTER
1 EACH MISCELLANEOUS
Qty: 1 EACH | Refills: 4 | Status: SHIPPED | OUTPATIENT
Start: 2023-10-30 | End: 2024-10-29

## 2023-10-30 NOTE — TELEPHONE ENCOUNTER
----- Message from Dany Santiago sent at 10/30/2023  3:33 PM CDT -----  Regarding: advise; inquiry  Contact: Ya (Mom) @ [phone]  Caller Ya (Mom) is calling asking to speak with someone in the office to discuss: insulin pump cart,auto,BT-cntr (OMNIPOD 5 G6 INTRO KIT, GEN 5,) Crtg. Caller states that she has a lot of questions. Caller states that she has class at 5:30pm this evening. If she misses the call, please call any time tomorrow. Thanks.

## 2023-11-03 ENCOUNTER — TELEPHONE (OUTPATIENT)
Dept: DIABETES | Facility: CLINIC | Age: 28
End: 2023-11-03
Payer: MEDICAID

## 2023-11-06 ENCOUNTER — TELEPHONE (OUTPATIENT)
Dept: DIABETES | Facility: CLINIC | Age: 28
End: 2023-11-06
Payer: MEDICAID

## 2023-11-08 ENCOUNTER — PATIENT MESSAGE (OUTPATIENT)
Dept: DIABETES | Facility: CLINIC | Age: 28
End: 2023-11-08
Payer: MEDICAID

## 2023-11-08 ENCOUNTER — TELEPHONE (OUTPATIENT)
Dept: DIABETES | Facility: CLINIC | Age: 28
End: 2023-11-08
Payer: MEDICAID

## 2023-11-09 ENCOUNTER — TELEPHONE (OUTPATIENT)
Dept: DIABETES | Facility: CLINIC | Age: 28
End: 2023-11-09
Payer: MEDICAID

## 2023-11-12 ENCOUNTER — PATIENT MESSAGE (OUTPATIENT)
Dept: DIABETES | Facility: CLINIC | Age: 28
End: 2023-11-12
Payer: MEDICAID

## 2023-11-13 ENCOUNTER — TELEPHONE (OUTPATIENT)
Dept: DIABETES | Facility: CLINIC | Age: 28
End: 2023-11-13
Payer: MEDICAID

## 2023-11-13 DIAGNOSIS — E10.65 TYPE 1 DIABETES MELLITUS WITH HYPERGLYCEMIA: ICD-10-CM

## 2023-11-13 RX ORDER — INSULIN ASPART 100 [IU]/ML
INJECTION, SOLUTION INTRAVENOUS; SUBCUTANEOUS
Qty: 60 ML | Refills: 6 | Status: SHIPPED | OUTPATIENT
Start: 2023-11-13 | End: 2023-12-28

## 2023-11-14 ENCOUNTER — TELEPHONE (OUTPATIENT)
Dept: DIABETES | Facility: CLINIC | Age: 28
End: 2023-11-14
Payer: MEDICAID

## 2023-11-14 ENCOUNTER — TELEPHONE (OUTPATIENT)
Dept: NEUROLOGY | Facility: CLINIC | Age: 28
End: 2023-11-14
Payer: MEDICAID

## 2023-11-14 NOTE — TELEPHONE ENCOUNTER
His appointment with me this week says for omnipod set up. I believe this was scheduled incorrectly and needs to be scheduled with emperatriz. Please change appointment and explain that diabetes education will educate him on the omnipod 5   Thank you

## 2023-11-14 NOTE — TELEPHONE ENCOUNTER
----- Message from Vanda Cook sent at 11/14/2023  9:28 AM CST -----  Regarding: return call  Contact: Ya  Caller:Ya        Returning call to: Ijeoma        Caller can be reached @: [phone]     Note, pt mother is calling back to see why the appt was cancelled on 11/15/2023. Please advisee.

## 2023-11-17 ENCOUNTER — CLINICAL SUPPORT (OUTPATIENT)
Dept: DIABETES | Facility: CLINIC | Age: 28
End: 2023-11-17
Payer: MEDICAID

## 2023-11-17 DIAGNOSIS — E10.65 TYPE 1 DIABETES MELLITUS WITH HYPERGLYCEMIA: ICD-10-CM

## 2023-11-17 PROCEDURE — 99999PBSHW PR PBB SHADOW TECHNICAL ONLY FILED TO HB: Mod: PBBFAC,,,

## 2023-11-17 PROCEDURE — 99211 OFF/OP EST MAY X REQ PHY/QHP: CPT | Mod: PBBFAC,PN | Performed by: DIETITIAN, REGISTERED

## 2023-11-17 PROCEDURE — 99999PBSHW PR PBB SHADOW TECHNICAL ONLY FILED TO HB: ICD-10-PCS | Mod: PBBFAC,,,

## 2023-11-17 PROCEDURE — 99999 PR PBB SHADOW E&M-EST. PATIENT-LVL I: CPT | Mod: PBBFAC,,, | Performed by: DIETITIAN, REGISTERED

## 2023-11-17 PROCEDURE — 99999 PR PBB SHADOW E&M-EST. PATIENT-LVL I: ICD-10-PCS | Mod: PBBFAC,,, | Performed by: DIETITIAN, REGISTERED

## 2023-11-17 PROCEDURE — G0108 DIAB MANAGE TRN  PER INDIV: HCPCS | Mod: PBBFAC,PN | Performed by: DIETITIAN, REGISTERED

## 2023-11-21 NOTE — PROGRESS NOTES
Diabetes Care Specialist Progress Note  Author: Inocencia Hurley RD, CDE  Date: 11/21/2023    Program Intake  Reason for Diabetes Program Visit:: Initial Diabetes Assessment  Type of Intervention:: Individual  Individual: Device Training  Device Training: Insulin Pump Upgrade  Current diabetes risk level:: low (HgbA1c 7.6)  In the last 12 months, have you:: none  Permission to speak with others about care:: no    Lab Results   Component Value Date    HGBA1C 7.6 (H) 09/13/2023       Clinical            There is no height or weight on file to calculate BMI.    Patient Health Rating  Compared to other people your age, how would you rate your health?: Good    Problem Review  Reviewed Problem List with Patient: yes  Active comorbidities affecting diabetes self-care.: yes  Comorbidities: Gastrointestinal Disorder  Reviewed health maintenance: yes    Clinical Assessment  Current Diabetes Treatment: Insulin pump, Insulin (tandem)  Have you ever experienced hypoglycemia (low blood sugar)?: yes  In the last month, how often have you experienced low blood sugar?: once every other week  Are you able to tell when your blood sugar is low?: Yes  What symptoms do you experience?: [place]ky  Have you ever been hospitalized because your blood sugar was too low?: no  How do you treat hypoglycemia (low blood sugar)?: 1/2 can soda/fruit juice  Have you ever experienced hyperglycemia (high blood sugar)?: yes  In the last month, how often have you experienced high blood sugar?: once every other week  Are you able to tell when your blood sugar is high?: No (comment)  Have you ever been hospitalized because your blood sugar was high?: yes (see comments)    Medication Information  How do you obtain your medications?: Family picks up  How many days a week do you miss your medications?: Never  Do you use a pill box or medication chart to help you manage your medications?: No  Do you sometimes have difficulty refilling your medications?:  No  Medication adherence impacting ability to self-manage diabetes?: No    Labs  Do you have regular lab work to monitor your medications?: Yes  Type of Regular Lab Work: A1c, Cholesterol, Microalbumin, CBC, BMP  Where do you get your labs drawn?: [facility]  Lab Compliance Barriers: No    Nutritional Status  Diet: Diabetic diet  Meal Plan 24 Hour Recall: Breakfast, Lunch, Dinner, Snack  Meal Plan 24 Hour Recall - Breakfast: yogurt  Meal Plan 24 Hour Recall - Lunch: sandwich with fruit and a protein bar  Meal Plan 24 Hour Recall - Dinner: home cooked like mac and cheese with chicken and a vegetable or salad  Meal Plan 24 Hour Recall - Snack: Lyubov's Peanut Butter Cups, Chips, Little Alessandra's  Change in appetite?: No  Dentation:: Intact  Recent Changes in Weight: No Recent Weight Change  Current nutritional status an area of need that is impacting patient's ability to self-manage diabetes?: No    Additional Social History    Support  Does anyone support you with your diabetes care?: yes  Who supports you?: family member, friend, paid caregiver, self, parent  Who takes you to your medical appointments?: family member, self, paid caregiver, parent  Does the current support meet the patient's needs?: Yes  Is Support an area impacting ability to self-manage diabetes?: No    Access to Mass Media & Technology  Does the patient have access to any of the following devices or technologies?: Smart phone  Media or technology needs impacting ability to self-manage diabetes?: No    Cognitive/Behavioral Health  Alert and Oriented: Yes  Difficulty Thinking: No  Requires Prompting: No  Requires assistance for routine expression?: No  Cognitive or behavioral barriers impacting ability to self-manage diabetes?: No    Culture/Buddhist  Culture or Protestant beliefs that may impact ability to access healthcare: No    Communication  Language preference: English  Hearing Problems: No  Vision Problems: Yes  Vision problem type:: Decreased  Vision  Vision Assistance: Glasses  Communication needs impacting ability to self-manage diabetes?: No    Health Literacy  Preferred Learning Method: Face to Face, Demonstration  How often do you need to have someone help you read instructions, pamphlets, or written material from your doctor or pharmacy?: Never  Health literacy needs impacting ability to self-manage diabetes?: No      Diabetes Self-Management Skills Assessment    Diabetes Disease Process/Treatment Options  Patient/caregiver able to state what happens when someone has diabetes.: yes  Patient/caregiver knows what type of diabetes they have.: yes  Diabetes Type : Type I  Patient/caregiver able to identify at least three signs and symptoms of diabetes.: yes  Identified signs and symptoms:: blurred vision, fatigue, frequent urination, increased thirst  Diabetes Disease Process/Treatment Options: Skills Assessment Completed: Yes  Assessment indicates:: Adequate understanding  Area of need?: No    Nutrition/Healthy Eating  Method of carbohydrate measurement:: carb counting/reading labels  Patient can identify foods that impact blood sugar.: yes  Patient-identified foods:: fruit/fruit juice, starches (bread, pasta, rice, cereal), milk, soda, starchy vegetables (corn, peas, beans), sweets, yogurt  Nutrition/Healthy Eating Skills Assessment Completed:: Yes  Assessment indicates:: Adequate understanding  Area of need?: No    Physical Activity/Exercise  Patient's daily activity level:: lightly active  Patient formally exercises outside of work.: no  Reasons for not exercising:: other (see comments)  Patient can identify forms of physical activity.: no  Patient can identify reasons why exercise/physical activity is important in diabetes management.: no  Physical Activity/Exercise Skills Assessment Completed: : Yes  Assessment indicates:: Adequate understanding  Area of need?: No    Medications  Patient is able to describe current diabetes management routine.:  yes  Diabetes management routine:: insulin pump, insulin, diet  Patient is able to identify current diabetes medications, dosages, and appropriate timing of medications.: yes  Patient understands the purpose of the medications taken for diabetes.: yes  Patient reports problems or concerns with current medication regimen.: yes  Medication regimen problems/concerns:: other (see comments) (changing from the tandem to the omnipod 5)  Medication Skills Assessment Completed:: Yes  Assessment indicates:: Knowledge deficit, Instruction Needed  Area of need?: Yes    Home Blood Glucose Monitoring  Patient states that blood sugar is checked at home daily.: yes  Monitoring Method:: personal continuous glucose monitor  Personal CGM type:: dexcom  Patient is able to use personal CGM appropriately.: yes  CGM Report reviewed?: no  Home Blood Glucose Monitoring Skills Assessment Completed: : Yes  Assessment indicates:: Adequate understanding, Other (comment) (needs to learn about adding the dexcom to the omnipod 5)  Area of need?: Yes    Acute Complications  Patient is able to identify types of acute complications: Yes  Patient Identified:: Hypoglycemia, Hyperglycemia  Patient is able to state the basic meaning of hypoglycemia?: Yes  Able to state the blood sugar range for hypoglycemia?: yes  Patient stated range:: <70  Patient can identify general symptoms of hypoglycemia: yes  Patient identified:: shakiness, dizziness  Able to state proper treatment of hypoglycemia?: yes  Patient identified:: 1/2 can soda/fruit juice, 5-6 pieces of hard candy, 1 tablespoon sugar/honey, 1 tube glucose gel, 4 glucose tablets  Patient is able to state the basic meaning of hyperglycemia?: Yes  Able to state the blood sugar range for hyperglycemia?: yes  Patient stated range:: >200  Patient able to state proper treatment of hyperglycemia?: yes  Patient identified:: take medication as recommended, monitor blood sugar  Patient able to verbalize sick day  plan?: yes  Patient-stated sick day plan:: call provider if blood sugar does not improve, drink more fluids, check blood sugar every 2-3 hours, seek medical attention for nausea, diarrhea, vomiting, fever with high blood sugar  Acute Complications Skills Assessment Completed: : Yes  Assessment indicates:: Adequate understanding  Area of need?: No    Chronic Complications  Patient can identify major chronic complications of diabetes.: yes  Stated chronic complications:: kidney disease, neuropathy/nerve damage, retinopathy, heart disease/heart attack  Patient can identify ways to prevent or delay diabetes complications.: yes  Stated ways to prevent complications:: healthy eating and regular activity, maintaining optimal blood glucose control, controlling blood sugar  Patient is aware that having diabetes increases risk of heart disease?: Yes  Patient is aware that heart disease is the leading cause of death and disability in people with diabetes?: Yes  Patient able to state risk factors for heart disease?: Yes  Patient stated risk factors for heart disease:: High blood pressure, High cholesterol, Having diabetes  Patient is taking statin?: No  Has your doctor talked to you about Statin use?: No  Do you want more information on Statins?: No  Chronic Complications Skills Assessment Completed: : Yes  Assessment indicates:: Adequate understanding  Area of need?: No    Psychosocial/Coping  Patient can identify ways of coping with chronic disease.: yes  Patient-stated ways of coping with chronic disease:: counseling/actively seeing behavioral professional, support from loved ones, meditation/yoga  Psychosocial/Coping Skills Assessment Completed: : Yes  Assessment indicates:: Adequate understanding  Area of need?: No      Assessment Summary and Plan    Based on today's diabetes care assessment, the following areas of need were identified:          11/17/2023    12:01 AM   Social   Support No   Access to Mass Media/Tech No    Cognitive/Behavioral Health No   Culture/Orthodox No   Communication No   Health Literacy No            11/17/2023    12:01 AM   Clinical   Medication Adherence No   Lab Compliance No   Nutritional Status No            11/17/2023    12:01 AM   Diabetes Self-Management Skills   Diabetes Disease Process/Treatment Options No   Nutrition/Healthy Eating No   Physical Activity/Exercise No   Medication Yes, Insulin Pump Education  OMNI POD 5 INSULIN PUMP START  Explained SmartAdjust Technology - Every 5 minutes, the system automatically increases, decreases, or pauses insulin delivery based on your customized target glucose--helping to protect against highs and lows, day and night.  Automated Mode vs Manual Mode vs Limited Automated Mode  Downloading the Alexx and ProConnect ([facility])  Pod and Dexcom need to be in line of site of each other  Inputting Dexcom Transmitter Code into Alexx or Handheld Device  Dexcom communicates with the pod directly and the Dexcom G6 alexx  Activity Feature  Changing the target and the length of time insulin is on board will adjust automated mode  Changing ICR/Basal Rates/ISF will only change setting in manual mode  SmartBolus Calculator - incorporates CGM data and trend data  Setup podder central account and linked Glooko account  Patient educated on insulin pump therapy, what a basal rate and bolus dose are, when to change pod, demonstrated how to prepare the syringe and pod for use with the pump, discussed ease of usage, basal and bolus, carbohydrate to insulin ratio, correction factors, approved areas to insert set, carbohydrate counting, error messages, explained the basal rates - patient will receive a little bit of insulin throughout 24 hours, bolus dose are delivered delivered by the inputting grams of carbohydrates, explained that patient will be counting carbohydrates and checking blood glucose before each meal, discussed when to change the pod, demonstrated how to fill the pod  with insulin, how to apply pod and insert the needle, explained and demonstrated how to safely retract the needle and remove the pod, discussed ease of usage, carbohydrate counting, demonstrated applying device, inserting needle, administering bolus insulin, retracting needle, and removing/disposing of pod. Patient states understanding and performed return demonstration. Patient started first pod in office. Patient provided with written literature and CDE contact information      Pump training was provided per Omni Pod protocol.  Patient is new to insulin pump therapy.      All settings obtained from Karuna Werner's last office visit note.   Pump settings:   Basal:    MN-6AM: 2.35 units/hr   MN-Noon: 1.75 units/hr   Noon-MN: 2 units/hr      ICR:    MN-6AM: 1: 6.5 grams   6Am-Noon: 1: 6 grams   Noon--4 PM- 1:4.5 grams  4PM- MN: 1: 4 grams      ISF  MN-6AM: 1: 20  6Am-Noon: 1: 12  Noon--4 PM- 1:12   4PM- MN: 1: 12      Target:    MN-6AM: 120  MN-Noon: 120  Noon-MN: 120     IOB: 3 hours     Max basal rate: 5 u/hr  Max bolus: 30 units     Low reservoir insulin alarm: 10 units  Change pod alarm: every 3 days      Details of pump therapy were covered included following controller features and programming, pod activation, pod site selection and rotation, automatic pod priming and insertion, setting & editing basal rates in manual mode, giving bolus and other features in the set-up menu.  The following regarding the Omnipod 5 was covered:  During your first Pod wear, since no recent history is available, the Omnipod 5 System uses only your active Basal Program from Manual Mode as a starting point to adjust your insulin. After 48 hours of history is collected, which usually happens with the first Pod wear, EcoSynth technology stops adjusting insulin against your active Basal Program and starts using the Adaptive Basal Rate for your automated insulin delivery with your next Pod change. With each Pod change, insulin  delivery information is sent and saved in the Omnipod 5 Alexx so that the next Pod that is started is updated with the new Adaptive Basal Rate. With each new Pod activation, the system adapts insulin delivery based on physiological needs and total daily insulin (TDI) delivered. After 2-3 Pod changes, adaptation generally stabilizes and automated insulin delivery is based on this adaptation.  Patient demonstrated ability to program controller, activate and insert pod using aseptic technique.  Patient demonstrated ability to program Dexcom transmitter into controller and start automated limited mode.    Instructed pt on use of basic pump features ie...give a bolus, pause insulin, switch from manual to automated mode.  Reviewed features available in manual mode verses automated mode.   Reviewed when and how to use activity function in automated mode.  Reviewed site selection of pods, rotation of sites and hard stop to change pod every 72 hrs.   Instructed that insulin vial is good out of refrigeration for up to 28-30 days.   Reviewed treatment of hypoglycemia, hyperglycemia; sick day care, DKA, and troubleshooting of pump.  Advised to carry back-up supplies with her and discussed steps to take in case of connection loss.   Omni Pod 24-hour support line provided.       Podder username: [email or web address]  Podder password: [email or web address]     Glooko username: [email or web address]   Gloparth password: [email or web address]   Home Blood Glucose Monitoring Yes, educated on adding the dexcom transmitter into the omnipod 5   Acute Complications No   Chronic Complications No   Psychosocial/Coping No          Today's interventions were provided through individual discussion, instruction, and written materials were provided.      Patient verbalized understanding of instruction and written materials.  Pt was able to return back demonstration of instructions today. Patient understood key points, needs reinforcement and further instruction.     Diabetes  Self-Management Care Plan:    Today's Diabetes Self-Management Care Plan was developed with Jonathon's input. Jonathon has agreed to work toward the following goal(s) to improve his/her overall diabetes control.      Care Plan: Diabetes Management   Updates made since 10/22/2023 12:00 AM        Problem: Medications         Goal: Patient Agrees to take bolus with lunch and dinner each day using the omnipod 5 insulin pump for the next 3 months.    Start Date: 11/17/2023   Expected End Date: 2/21/2024   This Visit's Progress: Deferred   Priority: High   Barriers: Lack of Supplies   Note:    Discussed squared and dual wave insulin, turned on feature in Medtronic pump. Patient will require training on the tandem insulin pump    6/4 starting tandem pump  Discussed and started Tandem T-Slim and Control IQ.   Patient educated on insulin pump therapy, the purpose of insulin pump therapy, advantages and disadvantages of insulin pump therapy and/vs multiple daily injections, what a basal rate and bolus dose are, when to change infusion set and reservoir , demonstrated how to prepare the reservoir and infusion set for use with the pump, discussed ease of usage, basal and bolus, carbohydrate to insulin ratio 1:6.5, correction factors 1:20, BG target 120, approved areas to insert set, carbohydrate counting, error messages, explained the multiple basal rates are MN-6AM 1.95 and 6AM-MN 1.75 - patient will receive a little bit of insulin throughout 24 hours for a total of 43.2 units, bolus dose are delivered delivered by the inputting grams of carbohydrates, explained that patient will be counting carbohydrates and checking blood glucose before each meal, discussed when to change the reservoir and tubing, demonstrated how to fill the reservoir with insulin, how to apply infusion set. Educated on putting Dexcom G6 sensor information into the t-slim. Patient states understanding. Will follow-up in 72 hours to perform return demonstration and  officially change from medtronic to tandem. Patient provided with written literature and CDE contact information     6/10 patient successfully changed his infusion set and cartridge. We reviewed how to change his sensor in his insulin pump. His numbers are getting better each day with fewer highs and lows.       Task: Explained Smart Adjust Technology Completed 11/21/2023        Task: patient demonstrated ability to program controller, activate and insert pod using aseptic technique. Completed 11/21/2023        Task: Patient demonstrated ability to program Dexcom transmitter into controller and start automated limited mode. Completed 11/21/2023        Task: Instructed pt on use of basic pump features ie...give a bolus, pause insulin, switch from manual to automated mode. Completed 11/21/2023        Task: Reviewed features available in manual mode verses automated mode. Completed 11/21/2023        Task: Reviewed when and how to use activity function in automated mode. Completed 11/21/2023        Task: Reviewed site selection of pods, rotation of sites and hard stop to change pod every 72 hrs. Completed 11/21/2023        Task: Instructed that insulin vial is good out of refrigeration for up to 28-30 days. Completed 11/21/2023        Task: Reviewed treatment of hypoglycemia, hyperglycemia; sick day care, DKA, and troubleshooting of pump. Completed 11/21/2023        Task: Advised to carry back-up supplies with her and discussed steps to take in case of connection loss. Completed 11/21/2023        Task: Details of pump therapy were covered including controller/yvrose features and programming and pod activation Completed 11/21/2023        Task: Reviewed pod site selection and rotation and automatic pod priming and insertion Completed 11/21/2023        Task: Reviewed setting & editing basal rates in manual mode, giving bolus and other features in the set-up menu. Completed 11/21/2023        Task: Patient provided with Podder  and [facility] usernames and passwords, also documented in chart note and blue sticky note in Epic Completed 11/21/2023        Task: Omni Pod 24-hour support line provided. Completed 11/21/2023        Task: Set up omnipod yvrose on patients phone, patient will be using yvrose rather than          Task: Omnipod data was downloaded and reviewed with patient and provider, any necessary adjustments were made         Task: Omnipod data was downloaded and reviewed with patient and provider, any necessary adjustments were made         Task: Omnipod data was downloaded and reviewed with patient and provider, any necessary adjustments were made         Problem: Acute Complications Resolved 11/17/2023        Goal: Patient agrees to identify and manage signs and symptoms of high/low blood sugar (hyper/hypoglycemia) by keeping a log of events and using proper treatment. Completed 11/17/2023   Start Date: 5/12/2021   This Visit's Progress: Met   Priority: Medium   Barriers: Lack of Supplies   Note:    Patient will require training on the Dexcom G6       Problem: Healthy Eating         Goal: Eat 3 meals daily with 45-60g/3-4 servings of Carbohydrate per meal. Completed 11/17/2023   Start Date: 5/12/2021   This Visit's Progress: On track   Priority: Low   Barriers: No Barriers Identified   Note:    Reviewed carb counting, portion control, importance of spacing meals throughout the day to prevent post prandial elevations.  Recommended low saturated fat, low sodium diet to aid in control of hypertension and cholesterol. Reviewed plate method and portion control, dining out tips, meal planning, reading a food label, healthy snack options, benefits of physical activity         Follow Up Plan     Follow up in about 1 week (around 11/24/2023) for Insulin Pump Upload, Personal CGM Upload.    Today's care plan and follow up schedule was discussed with patient.  Jonathon verbalized understanding of the care plan, goals, and agrees to follow up  plan.        The patient was encouraged to communicate with his/her health care provider/physician and care team regarding his/her condition(s) and treatment.  I provided the patient with my contact information today and encouraged to contact me via phone or [facility] Patient Portal as needed.     Length of Visit   Total Time: 70 Minutes

## 2023-12-14 ENCOUNTER — TELEPHONE (OUTPATIENT)
Dept: DIABETES | Facility: CLINIC | Age: 28
End: 2023-12-14
Payer: MEDICAID

## 2023-12-15 ENCOUNTER — PATIENT MESSAGE (OUTPATIENT)
Dept: DIABETES | Facility: CLINIC | Age: 28
End: 2023-12-15
Payer: MEDICAID

## 2023-12-15 ENCOUNTER — TELEPHONE (OUTPATIENT)
Dept: DIABETES | Facility: CLINIC | Age: 28
End: 2023-12-15
Payer: MEDICAID

## 2023-12-15 LAB — HBA1C MFR BLD: 7.5 % OF TOTAL HGB

## 2023-12-28 ENCOUNTER — OFFICE VISIT (OUTPATIENT)
Dept: DIABETES | Facility: CLINIC | Age: 28
End: 2023-12-28
Payer: MEDICAID

## 2023-12-28 VITALS
HEIGHT: 71 IN | WEIGHT: 248 LBS | OXYGEN SATURATION: 98 % | HEART RATE: 78 BPM | SYSTOLIC BLOOD PRESSURE: 132 MMHG | BODY MASS INDEX: 34.72 KG/M2 | DIASTOLIC BLOOD PRESSURE: 82 MMHG

## 2023-12-28 DIAGNOSIS — F84.0 AUTISM SPECTRUM DISORDER: ICD-10-CM

## 2023-12-28 DIAGNOSIS — E10.65 TYPE 1 DIABETES MELLITUS WITH HYPERGLYCEMIA: Primary | ICD-10-CM

## 2023-12-28 DIAGNOSIS — E66.09 CLASS 1 OBESITY DUE TO EXCESS CALORIES WITH SERIOUS COMORBIDITY AND BODY MASS INDEX (BMI) OF 34.0 TO 34.9 IN ADULT: ICD-10-CM

## 2023-12-28 DIAGNOSIS — Z46.81 INSULIN PUMP FITTING OR ADJUSTMENT: ICD-10-CM

## 2023-12-28 DIAGNOSIS — Z96.41 INSULIN PUMP IN PLACE: ICD-10-CM

## 2023-12-28 DIAGNOSIS — E88.819 INSULIN RESISTANCE: ICD-10-CM

## 2023-12-28 PROCEDURE — 3079F PR MOST RECENT DIASTOLIC BLOOD PRESSURE 80-89 MM HG: ICD-10-PCS | Mod: CPTII,,, | Performed by: NURSE PRACTITIONER

## 2023-12-28 PROCEDURE — 1159F MED LIST DOCD IN RCRD: CPT | Mod: CPTII,,, | Performed by: NURSE PRACTITIONER

## 2023-12-28 PROCEDURE — 3061F PR NEG MICROALBUMINURIA RESULT DOCUMENTED/REVIEW: ICD-10-PCS | Mod: CPTII,,, | Performed by: NURSE PRACTITIONER

## 2023-12-28 PROCEDURE — 99999 PR PBB SHADOW E&M-EST. PATIENT-LVL IV: CPT | Mod: PBBFAC,,, | Performed by: NURSE PRACTITIONER

## 2023-12-28 PROCEDURE — 3061F NEG MICROALBUMINURIA REV: CPT | Mod: CPTII,,, | Performed by: NURSE PRACTITIONER

## 2023-12-28 PROCEDURE — 95251 CONT GLUC MNTR ANALYSIS I&R: CPT | Mod: ,,, | Performed by: NURSE PRACTITIONER

## 2023-12-28 PROCEDURE — 3051F PR MOST RECENT HEMOGLOBIN A1C LEVEL 7.0 - < 8.0%: ICD-10-PCS | Mod: CPTII,,, | Performed by: NURSE PRACTITIONER

## 2023-12-28 PROCEDURE — 3079F DIAST BP 80-89 MM HG: CPT | Mod: CPTII,,, | Performed by: NURSE PRACTITIONER

## 2023-12-28 PROCEDURE — 3008F BODY MASS INDEX DOCD: CPT | Mod: CPTII,,, | Performed by: NURSE PRACTITIONER

## 2023-12-28 PROCEDURE — 95251 PR GLUCOSE MONITOR, 72 HOUR, PHYS INTERP: ICD-10-PCS | Mod: ,,, | Performed by: NURSE PRACTITIONER

## 2023-12-28 PROCEDURE — 3066F PR DOCUMENTATION OF TREATMENT FOR NEPHROPATHY: ICD-10-PCS | Mod: CPTII,,, | Performed by: NURSE PRACTITIONER

## 2023-12-28 PROCEDURE — 3008F PR BODY MASS INDEX (BMI) DOCUMENTED: ICD-10-PCS | Mod: CPTII,,, | Performed by: NURSE PRACTITIONER

## 2023-12-28 PROCEDURE — 1159F PR MEDICATION LIST DOCUMENTED IN MEDICAL RECORD: ICD-10-PCS | Mod: CPTII,,, | Performed by: NURSE PRACTITIONER

## 2023-12-28 PROCEDURE — 99214 PR OFFICE/OUTPT VISIT, EST, LEVL IV, 30-39 MIN: ICD-10-PCS | Mod: S$PBB,,, | Performed by: NURSE PRACTITIONER

## 2023-12-28 PROCEDURE — 3066F NEPHROPATHY DOC TX: CPT | Mod: CPTII,,, | Performed by: NURSE PRACTITIONER

## 2023-12-28 PROCEDURE — 1160F RVW MEDS BY RX/DR IN RCRD: CPT | Mod: CPTII,,, | Performed by: NURSE PRACTITIONER

## 2023-12-28 PROCEDURE — 99999 PR PBB SHADOW E&M-EST. PATIENT-LVL IV: ICD-10-PCS | Mod: PBBFAC,,, | Performed by: NURSE PRACTITIONER

## 2023-12-28 PROCEDURE — 3075F SYST BP GE 130 - 139MM HG: CPT | Mod: CPTII,,, | Performed by: NURSE PRACTITIONER

## 2023-12-28 PROCEDURE — 1160F PR REVIEW ALL MEDS BY PRESCRIBER/CLIN PHARMACIST DOCUMENTED: ICD-10-PCS | Mod: CPTII,,, | Performed by: NURSE PRACTITIONER

## 2023-12-28 PROCEDURE — 99214 OFFICE O/P EST MOD 30 MIN: CPT | Mod: PBBFAC,PN | Performed by: NURSE PRACTITIONER

## 2023-12-28 PROCEDURE — 3051F HG A1C>EQUAL 7.0%<8.0%: CPT | Mod: CPTII,,, | Performed by: NURSE PRACTITIONER

## 2023-12-28 PROCEDURE — 99214 OFFICE O/P EST MOD 30 MIN: CPT | Mod: S$PBB,,, | Performed by: NURSE PRACTITIONER

## 2023-12-28 PROCEDURE — 3075F PR MOST RECENT SYSTOLIC BLOOD PRESS GE 130-139MM HG: ICD-10-PCS | Mod: CPTII,,, | Performed by: NURSE PRACTITIONER

## 2023-12-28 RX ORDER — INSULIN ASPART INJECTION 100 [IU]/ML
INJECTION, SOLUTION SUBCUTANEOUS
Qty: 60 ML | Refills: 11 | Status: SHIPPED | OUTPATIENT
Start: 2023-12-28

## 2023-12-28 NOTE — ASSESSMENT & PLAN NOTE
Uncontrolled   Reviewed pump optimization with patient  Discussed that he needs to stay in automated mode as much as possible.  Reviewed pump troubleshooting  Discussed hitting the use CGM button  Discussed changing him to Fiasp instead of Novolog       -- Medication Changes:  Continue Omnipod 5.  Remain in auto mode as much as possible    Pump settings: continue current pump settings.   Basal:  Adjusted basal rates when he is not in automated mode  MN-6AM: 2.5 units/hr   MN-Noon: 2 units/hr   Noon-MN: 2.35 units/hr       ICR:  Tighten carbohydrate ratios  MN-6AM: 1: 5 grams   6Am-Noon: 1: 5 grams   Noon--4 PM- 1:4 grams  4PM- MN: 1: 4 grams       ISF  MN-6AM: 1: 20  6Am-Noon: 1: 12  Noon--4 PM- 1:12   4PM- MN: 1: 12       Target:  110    IOB: 3 hours    -- Reviewed goals of therapy are to get the best control we can without hypoglycemia.  -- Reviewed patient's current insulin regimen. Clarified proper insulin dose and timing in relation to meals, etc. Insulin injection sites and proper rotation instructed.   -- Advised frequent self blood glucose monitoring.  Patient encouraged to document glucose results and bring them to every clinic visit. - continue to use Dexcom -- supplies come from pharmacy now  -- Hypoglycemia precautions discussed. Instructed on precautions before driving.    -- Call for Bg repeatedly < 70 or > 200.   -- Close adherence to lifestyle changes recommended.   -- Periodic follow ups for eye evaluations, foot care and dental care suggested.  -- diabetes education --- omnipod 5 start       Patient requires a therapeutic CGM and is willing to use therapeutic CGM/SMBG for Necessary frequent adjustments in insulin therapy. Patient is using SMBG for glucose monitoring (4 + times a day).  Patient is having Hypoglycemia and Hypoglycemia Unawareness of <50mg/dl.   I have assessed adherence to CGM regimen and to the plan.

## 2023-12-28 NOTE — PROGRESS NOTES
CC:   Chief Complaint   Patient presents with    Diabetes Mellitus       HPI: Jonathon Daniel is a 28 y.o. male presents for a follow up visit today for the management of T1DM.      He  was diagnosed with Type 1 diabetes in 2009 (14 years old)when he went into DKA with BG reading of 1108. Started on insulin therapy at the time of diagnosis.   He started with the Omnipod within a year of diagnosis of T1DM. He started the Medtronic 670G in 2018. He has also worn the [place] pump and Medtronic 670G pump in the past.   He previously wore a Dexcom personal CGM- G4 and he really liked it.   He does not care for the Medtronic 670G pump or the Guardian sensor.  Reports that he was never able to get the guardian sensor to work right and has never been able to be in auto mode on the 670G.  He is now on the Dexcom G6.  He started the Tandem pump in June 2021.   He is now off the tandem insulin pump in on the Omnipod 5  He started the Omnipod 5 on November 17, 2023    Family hx of diabetes: uncle- T1DM- JOSE ROBERTO   Hospitalized for diabetes: a couple of visits to the ER for hyperglycemia   DKA at diagnosis   In 2020- DKA - pump malfunction     No personal or FH of thyroid cancer or personal of pancreatic cancer or pancreatitis.         Our last visit was in September of 2023  Since then he has changed over to the Omnipod 5---started the Omnipod 5 on November 17, 2023  He is liking the Omnipod 5 in Dexcom integration.  His A1c is still above goal at 7.5%.  Blood sugar readings are above goal on Omnipod download but he is still learning the system  See attached downloads  He presents with his mother  Based on his download he is taking in significantly less carbohydrates than before.  Mom reports that they have been working on his diet.  I do have some concern that there may be some missed doses of carbohydrate intake                DIABETES COMPLICATIONS: none      Diabetes Management Status    ASA:  No    Statin: Not taking  ACE/ARB:  Not taking    Screening or Prevention Patient's value Goal Complete/Controlled?   HgA1C Testing and Control   Lab Results   Component Value Date    HGBA1C 7.5 (H) 12/14/2023      Annually/Less than 8% No   Lipid profile : 09/13/2023 Annually No   LDL control Lab Results   Component Value Date    LDLCALC 114 (H) 09/13/2023    Annually/Less than 100 mg/dl  No   Nephropathy screening Lab Results   Component Value Date    LABMICR 11.0 09/24/2018     Lab Results   Component Value Date    PROTEINUA Negative 04/13/2021    Annually No   Blood pressure BP Readings from Last 1 Encounters:   12/28/23 132/82    Less than 140/90 Yes   Dilated retinal exam : 04/08/2022 - external- [place] --  Annually Yes   Foot exam   : 09/15/2023 Annually Yes       CURRENT A1C:    Hemoglobin A1C   Date Value Ref Range Status   12/14/2023 7.5 (H) <5.7 % of total Hgb Final     Comment:     For someone without known diabetes, a hemoglobin A1c  value of 6.5% or greater indicates that they may have   diabetes and this should be confirmed with a follow-up   test.     For someone with known diabetes, a value <7% indicates   that their diabetes is well controlled and a value   greater than or equal to 7% indicates suboptimal   control. A1c targets should be individualized based on   duration of diabetes, age, comorbid conditions, and   other considerations.     Currently, no consensus exists regarding use of  hemoglobin A1c for diagnosis of diabetes for children.         09/13/2023 7.6 (H) <5.7 % of total Hgb Final     Comment:     For someone without known diabetes, a hemoglobin A1c  value of 6.5% or greater indicates that they may have   diabetes and this should be confirmed with a follow-up   test.     For someone with known diabetes, a value <7% indicates   that their diabetes is well controlled and a value   greater than or equal to 7% indicates suboptimal   control. A1c targets should be individualized based on   duration of diabetes, age,  comorbid conditions, and   other considerations.     Currently, no consensus exists regarding use of  hemoglobin A1c for diagnosis of diabetes for children.         2023 6.6 (H) <5.7 % of total Hgb Final     Comment:     For someone without known diabetes, a hemoglobin A1c  value of 6.5% or greater indicates that they may have   diabetes and this should be confirmed with a follow-up   test.     For someone with known diabetes, a value <7% indicates   that their diabetes is well controlled and a value   greater than or equal to 7% indicates suboptimal   control. A1c targets should be individualized based on   duration of diabetes, age, comorbid conditions, and   other considerations.     Currently, no consensus exists regarding use of  hemoglobin A1c for diagnosis of diabetes for children.             GOAL A1C: 6.5% without hypoglycemia     DM MEDICATIONS USED IN THE PAST: Medtronic 670G pump   Omnipod   [place]   Dexcom G4   Levemir   Lantus   Novolog   Humalog   Dexcom G6   Tandem pump   Omnipod 5       CURRENT DIABETES MEDICATIONS:   Insulin Pump: Omnipod 5 with Novolog     Remain in Control IQ      Pump settings: continue current pump settings.   Basal:    MN-6AM: 2.35 units/hr   MN-Noon: 1.75 units/hr   Noon-MN: 2 units/hr         ICR:    MN-6AM: 1: 6.5 grams   6Am-Noon: 1: 6 grams   Noon--4 PM- 1:4.5 grams  4PM- MN: 1: 4 grams         ISF: tightened correction     MN-6AM: 1: 20  6Am-Noon: 1: 12  Noon--4 PM- 1:12   4PM- MN: 1: 12         Target:    MN-6AM: 120  MN-Noon: 120  Noon-MN: 120     IOB: 3 hours -- 5 hours when in control IQ        Temp basals: none     Pump site change: q 2 days  Insulin TDD: 86.5 units   Basal 63%   Bolus 37%,  CHO intake:96.6 grams of carbohydrates ---may be missing some CHO entries   Using the bolus wizard: yes   [place]%   Automated mode 84% of the time manual mode 16% of the time    Back up Lantus/Levemir:  Back up Rx at pharmacy-- Levemir     Patient's pump was  downloaded in clinic today and reviewed with patient.   Please see attached documents for pump download.     Pump supplies from [place]       BLOOD GLUCOSE MONITORING:    Sensor type: Dexcom G6   Average BG readin  Time in range:  44%  37% high, 18% very high, 1% low, 0% very low  Site change:  q10 days    Sensor was downloaded in clinic today and reviewed with patient.   Please see attached document for download.     Supplies from [facility]      HYPOGLYCEMIA: rarely   Has gone as low as 50's  May have hypoglycemia unawareness- difficult due to hx of autism and hx of anoxic brain injury.   Glucagon kit: [employer]   Medic alert bracelet: yes- bracelet         MEALS: eating 2-3 meals per day   Mom reports they have cut back on carbohydrate intake.  He is only entering in about 96 g of carbs per day which is significantly less than previous entries  Drinks: diet sodas   Water with SF packets        CURRENT EXERCISE:  No formal       Review of Systems  Review of Systems   Constitutional:  Negative for appetite change and fatigue.   HENT:  Negative for trouble swallowing.    Eyes:  Negative for visual disturbance.   Respiratory:  Negative for shortness of breath.    Cardiovascular:  Negative for chest pain.   Gastrointestinal:  Negative for nausea.   Endocrine: Negative for polydipsia, polyphagia and polyuria.   Genitourinary:         No Nocturia    Skin:  Negative for wound.   Neurological:  Negative for numbness.       Physical Exam   Physical Exam  Vitals and nursing note reviewed.   Constitutional:       Appearance: He is well-developed. He is obese.   HENT:      Head: Normocephalic and atraumatic.      Right Ear: External ear normal.      Left Ear: External ear normal.      Nose: Nose normal.   Neck:      Thyroid: No thyromegaly.      Trachea: No tracheal deviation.   Cardiovascular:      Rate and Rhythm: Normal rate and regular rhythm.      Heart sounds: No murmur heard.  Pulmonary:      Effort: Pulmonary  effort is normal. No respiratory distress.      Breath sounds: Normal breath sounds.   Abdominal:      Palpations: Abdomen is soft.      Tenderness: There is no abdominal tenderness.      Hernia: No hernia is present.   Musculoskeletal:      Cervical back: Normal range of motion and neck supple.   Skin:     General: Skin is warm and dry.      Capillary Refill: Capillary refill takes less than 2 seconds.      Findings: No rash.      Comments: Insulin pump sites and Dexcom sites are normal appearing. No lipo hypertropthy or atrophy     Neurological:      Mental Status: He is alert and oriented to person, place, and time.      Cranial Nerves: No cranial nerve deficit.   Psychiatric:         Behavior: Behavior normal.         Judgment: Judgment normal.         FOOT EXAMINATION: Appropriate footwear     Protective Sensation (w/ 10 gram monofilament):  Right: Intact  Left: Intact    Visual Inspection:  Normal -  Bilateral and Nails Intact - without Evidence of Foot Deformity- Bilateral    Pedal Pulses:   Right: Present  Left: Present    Posterior Tibialis Pulses:   Right:Present  Left: Present          Lab Results   Component Value Date    TSH 2.04 09/13/2023           Type 1 diabetes mellitus with hyperglycemia  Uncontrolled   Reviewed pump optimization with patient  Discussed that he needs to stay in automated mode as much as possible.  Reviewed pump troubleshooting  Discussed hitting the use CGM button  Discussed changing him to Fiasp instead of Novolog       -- Medication Changes:  Continue Omnipod 5.  Remain in auto mode as much as possible    Pump settings: continue current pump settings.   Basal:  Adjusted basal rates when he is not in automated mode  MN-6AM: 2.5 units/hr   MN-Noon: 2 units/hr   Noon-MN: 2.35 units/hr       ICR:  Tighten carbohydrate ratios  MN-6AM: 1: 5 grams   6Am-Noon: 1: 5 grams   Noon--4 PM- 1:4 grams  4PM- MN: 1: 4 grams       ISF  MN-6AM: 1: 20  6Am-Noon: 1: 12  Noon--4 PM- 1:12   4PM- MN:  1: 12       Target:  110    IOB: 3 hours    -- Reviewed goals of therapy are to get the best control we can without hypoglycemia.  -- Reviewed patient's current insulin regimen. Clarified proper insulin dose and timing in relation to meals, etc. Insulin injection sites and proper rotation instructed.   -- Advised frequent self blood glucose monitoring.  Patient encouraged to document glucose results and bring them to every clinic visit. - continue to use Dexcom -- supplies come from pharmacy now  -- Hypoglycemia precautions discussed. Instructed on precautions before driving.    -- Call for Bg repeatedly < 70 or > 200.   -- Close adherence to lifestyle changes recommended.   -- Periodic follow ups for eye evaluations, foot care and dental care suggested.  -- diabetes education --- omnipod 5 start       Patient requires a therapeutic CGM and is willing to use therapeutic CGM/SMBG for Necessary frequent adjustments in insulin therapy. Patient is using SMBG for glucose monitoring (4 + times a day).  Patient is having Hypoglycemia and Hypoglycemia Unawareness of <50mg/dl.   I have assessed adherence to CGM regimen and to the plan.        Insulin pump in place  See above     Insulin pump fitting or adjustment  See above     Class 1 obesity due to excess calories with serious comorbidity and body mass index (BMI) of 34.0 to 34.9 in adult  Body mass index is 34.59 kg/m².  Increases insulin resistance.   Discussed DM diet and exercise.       Insulin resistance  TDD of  86.5 units   May benefit from Metformin- can consider in the future     Autism spectrum disorder  May hinder overall diabetes management  Has strong family support           Pump backup plan    If the insulin pump is non functional and discontinued for anticipated more than 20 hours, please give daily injections of:   Long acting insulin Levemir  28 units BID   Short acting insulin Novolog 1:5-1:6 for meals according to carb ratios and sensitivity factor in  the pump.     When the insulin pump is restarted, do not restart basal rates until at least 22 hours after the last long acting insulin injection. You can set a 0% temporary basal setting that will last until this time and use your pump to bolus for meals and correction.     For any technical insulin pump issues, please contact the insulin pump company; the toll free number is printed on the label on the back of the insulin pump.       If your sugar is running high for a few hours and does not respond to two correction doses from the insulin pump, it may mean that you have a bad pump site and the site should be changed         I spent a total of 30 minutes on the day of the visit.This includes face to face time and non-face to face time preparing to see the patient (eg, review of tests), obtaining and/or reviewing separately obtained history, documenting clinical information in the electronic or other health record, independently interpreting results and communicating results to the patient/family/caregiver, or care coordinator.        Follow up in about 4 months (around 4/28/2024).    Follow up with me in 4 months   Labs prior at [facility]       No orders of the defined types were placed in this encounter.      Recommendations were discussed with the patient in detail  The patient verbalized understanding and agrees with the plan outlined as above.     This note was partly generated with [employer] voice recognition software. I apologize for any possible typographical errors.

## 2023-12-28 NOTE — ASSESSMENT & PLAN NOTE
Body mass index is 34.59 kg/m².  Increases insulin resistance.   Discussed DM diet and exercise.

## 2024-01-27 ENCOUNTER — HOSPITAL ENCOUNTER (EMERGENCY)
Facility: HOSPITAL | Age: 29
Discharge: HOME | End: 2024-01-27
Attending: EMERGENCY MEDICINE
Payer: MEDICAID

## 2024-01-27 VITALS
SYSTOLIC BLOOD PRESSURE: 130 MMHG | HEIGHT: 71 IN | OXYGEN SATURATION: 100 % | HEART RATE: 80 BPM | BODY MASS INDEX: 31.36 KG/M2 | RESPIRATION RATE: 18 BRPM | TEMPERATURE: 98 F | DIASTOLIC BLOOD PRESSURE: 68 MMHG | WEIGHT: 224 LBS

## 2024-01-27 DIAGNOSIS — L03.011 CELLULITIS OF FINGER OF RIGHT HAND: ICD-10-CM

## 2024-01-27 DIAGNOSIS — L03.011 PARONYCHIA OF FINGER OF RIGHT HAND: Primary | ICD-10-CM

## 2024-01-27 LAB
ALBUMIN SERPL BCP-MCNC: 4.7 G/DL (ref 3.5–5.2)
ALP SERPL-CCNC: 73 U/L (ref 55–135)
ALT SERPL W/O P-5'-P-CCNC: 43 U/L (ref 10–44)
ANION GAP SERPL CALC-SCNC: 8 MMOL/L (ref 8–16)
AST SERPL-CCNC: 36 U/L (ref 10–40)
B-OH-BUTYR BLD STRIP-SCNC: 0.1 MMOL/L (ref 0–0.5)
BASOPHILS # BLD AUTO: 0.04 K/UL (ref 0–0.2)
BASOPHILS NFR BLD: 0.7 % (ref 0–1.9)
BILIRUB SERPL-MCNC: 0.5 MG/DL (ref 0.1–1)
BUN SERPL-MCNC: 15 MG/DL (ref 6–20)
CALCIUM SERPL-MCNC: 9 MG/DL (ref 8.7–10.5)
CHLORIDE SERPL-SCNC: 104 MMOL/L (ref 95–110)
CO2 SERPL-SCNC: 20 MMOL/L (ref 23–29)
CREAT SERPL-MCNC: 1.2 MG/DL (ref 0.5–1.4)
DIFFERENTIAL METHOD BLD: ABNORMAL
EOSINOPHIL # BLD AUTO: 0.1 K/UL (ref 0–0.5)
EOSINOPHIL NFR BLD: 2 % (ref 0–8)
ERYTHROCYTE [DISTWIDTH] IN BLOOD BY AUTOMATED COUNT: 11.8 % (ref 11.5–14.5)
EST. GFR  (NO RACE VARIABLE): >60 ML/MIN/1.73 M^2
GLUCOSE SERPL-MCNC: 294 MG/DL (ref 70–110)
GLUCOSE SERPL-MCNC: 312 MG/DL (ref 70–110)
HCT VFR BLD AUTO: 47.1 % (ref 40–54)
HGB BLD-MCNC: 16.2 G/DL (ref 14–18)
IMM GRANULOCYTES # BLD AUTO: 0.02 K/UL (ref 0–0.04)
IMM GRANULOCYTES NFR BLD AUTO: 0.3 % (ref 0–0.5)
LYMPHOCYTES # BLD AUTO: 1 K/UL (ref 1–4.8)
LYMPHOCYTES NFR BLD: 16.6 % (ref 18–48)
MCH RBC QN AUTO: 29.5 PG (ref 27–31)
MCHC RBC AUTO-ENTMCNC: 34.4 G/DL (ref 32–36)
MCV RBC AUTO: 86 FL (ref 82–98)
MONOCYTES # BLD AUTO: 0.5 K/UL (ref 0.3–1)
MONOCYTES NFR BLD: 8 % (ref 4–15)
NEUTROPHILS # BLD AUTO: 4.5 K/UL (ref 1.8–7.7)
NEUTROPHILS NFR BLD: 72.4 % (ref 38–73)
NRBC BLD-RTO: 0 /100 WBC
PLATELET # BLD AUTO: 225 K/UL (ref 150–450)
PMV BLD AUTO: 9.7 FL (ref 9.2–12.9)
POTASSIUM SERPL-SCNC: 3.9 MMOL/L (ref 3.5–5.1)
PROT SERPL-MCNC: 7.5 G/DL (ref 6–8.4)
RBC # BLD AUTO: 5.5 M/UL (ref 4.6–6.2)
SODIUM SERPL-SCNC: 132 MMOL/L (ref 136–145)
WBC # BLD AUTO: 6.15 K/UL (ref 3.9–12.7)

## 2024-01-27 PROCEDURE — 85025 COMPLETE CBC W/AUTO DIFF WBC: CPT | Performed by: NURSE PRACTITIONER

## 2024-01-27 PROCEDURE — 25000003 PHARM REV CODE 250: Performed by: EMERGENCY MEDICINE

## 2024-01-27 PROCEDURE — 82010 KETONE BODYS QUAN: CPT | Performed by: NURSE PRACTITIONER

## 2024-01-27 PROCEDURE — 82962 GLUCOSE BLOOD TEST: CPT

## 2024-01-27 PROCEDURE — 99284 EMERGENCY DEPT VISIT MOD MDM: CPT | Mod: 25

## 2024-01-27 PROCEDURE — 80053 COMPREHEN METABOLIC PANEL: CPT | Performed by: NURSE PRACTITIONER

## 2024-01-27 PROCEDURE — 26010 DRAINAGE OF FINGER ABSCESS: CPT | Mod: F8

## 2024-01-27 RX ORDER — LIDOCAINE HYDROCHLORIDE 10 MG/ML
5 INJECTION, SOLUTION EPIDURAL; INFILTRATION; INTRACAUDAL; PERINEURAL
Status: DISCONTINUED | OUTPATIENT
Start: 2024-01-27 | End: 2024-01-27

## 2024-01-27 RX ORDER — SULFAMETHOXAZOLE AND TRIMETHOPRIM 800; 160 MG/1; MG/1
1 TABLET ORAL 2 TIMES DAILY
Qty: 10 TABLET | Refills: 0 | Status: SHIPPED | OUTPATIENT
Start: 2024-01-27 | End: 2024-02-01

## 2024-01-27 RX ORDER — LIDOCAINE HYDROCHLORIDE 10 MG/ML
5 INJECTION, SOLUTION EPIDURAL; INFILTRATION; INTRACAUDAL; PERINEURAL
Status: COMPLETED | OUTPATIENT
Start: 2024-01-27 | End: 2024-01-27

## 2024-01-27 RX ORDER — HYDROCODONE BITARTRATE AND ACETAMINOPHEN 5; 325 MG/1; MG/1
1 TABLET ORAL EVERY 6 HOURS PRN
Qty: 12 TABLET | Refills: 0 | Status: SHIPPED | OUTPATIENT
Start: 2024-01-27 | End: 2024-04-17

## 2024-01-27 RX ADMIN — LIDOCAINE HYDROCHLORIDE 50 MG: 10 INJECTION, SOLUTION EPIDURAL; INFILTRATION; INTRACAUDAL; PERINEURAL at 02:01

## 2024-01-27 NOTE — ED PROVIDER NOTES
Encounter Date: 1/27/2024       History     Chief Complaint   Patient presents with    Hand Pain     Pt went to urgent care thursday for finger pain, it was lanced and he started abt. The finger is now swollen and not getting better.      28-year-old male presents emergency room with complaints of pain and swelling to his right hand ring finger.  Patient states he has been present for several days and had been seen at an urgent care shower met 2-3 days ago and started on Bactrim but no incision or drainage was done.  Patient states he did have little spontaneous drainage of pus at the skin adjacent to the nail.  No fever or chills.  No history any lymphangitis.  Patient does have history of insulin-dependent diabetes as well as autism.  Despite the antibiotics the patient states he has had progressive pain swelling to the affected area.      Review of patient's allergies indicates:   Allergen Reactions    Versed [midazolam] Hallucinations    Versed [midazolam]      Past Medical History:   Diagnosis Date    Autism     Diabetes mellitus type I     History of anoxic brain injury     Megacolon due to infectious colitis     Patent ductus arteriosus     at birth     Past Surgical History:   Procedure Laterality Date    CARDIAC SURGERY      congential heart defect- patent ductus arterious     GASTRIC FUNDOPLICATION       Family History   Problem Relation Age of Onset    Diabetes Paternal Uncle      Social History     Tobacco Use    Smoking status: Never    Smokeless tobacco: Never   Substance Use Topics    Alcohol use: No    Drug use: No     Review of Systems   Constitutional:  Positive for activity change. Negative for chills, diaphoresis and fever.   Musculoskeletal:  Negative for arthralgias and joint swelling.   Skin:  Positive for color change and wound. Negative for pallor.       Physical Exam     Initial Vitals [01/27/24 1302]   BP Pulse Resp Temp SpO2   (!) 205/109 97 16 98.9 °F (37.2 °C) 97 %      MAP       --          Physical Exam    Vitals reviewed.  Constitutional: He appears well-developed and well-nourished.   Appears uncomfortable   HENT:   Head: Normocephalic.   Musculoskeletal:         General: Tenderness and edema present. Normal range of motion.      Comments: Examination of the right ring finger reveals evidence of a paronychia with surrounding erythema.  No drainage appreciated.  Nail nailbed is normal.  There is no evidence of felon at this time.  Patient is able to flex and extend the DIP joint.     Skin: Skin is warm and dry. Capillary refill takes less than 2 seconds. Abscess noted. There is erythema.         ED Course   Procedures  Labs Reviewed   CBC W/ AUTO DIFFERENTIAL - Abnormal; Notable for the following components:       Result Value    Lymph % 16.6 (*)     All other components within normal limits   COMPREHENSIVE METABOLIC PANEL - Abnormal; Notable for the following components:    Sodium 132 (*)     CO2 20 (*)     Glucose 312 (*)     All other components within normal limits   POCT GLUCOSE - Abnormal; Notable for the following components:    POC Glucose 294 (*)     All other components within normal limits   BETA - HYDROXYBUTYRATE, SERUM   POCT GLUCOSE MONITORING CONTINUOUS          Imaging Results              X-Ray Hand 3 view Right (Final result)  Result time 01/27/24 15:03:35      Final result by Bo Kim Jr., MD (01/27/24 15:03:35)                   Narrative:    EXAMINATION: XR BONE/JOINT    CLINICAL INDICATION: Male, 28 years old.  infection    TECHNIQUE: 3 view radiograph of the right hand were obtained. [id number].    COMPARISON: No prior exam.    FINDINGS: No evidence of fracture or dislocation. Normal alignment. No evidence of arthropathy or other focal bone lesion. Soft tissues are unremarkable.    IMPRESSION:  No acute or significant abnormalities.    Electronically signed by:  Bo Kim MD  01/27/2024 03:03 PM [facility] [place] Workstation: [id number]                                      Medications   LIDOcaine (PF) 10 mg/ml (1%) injection 50 mg (has no administration in time range)     Medical Decision Making  Risk  Prescription drug management.              Attending Attestation:             Attending ED Notes:   ED course and MDM:  The patient had screening labs obtained in his had no elevated white count but he has a blood sugar of 312.  The patient's x-ray of the right ring finger is negative for any bony abnormalities.    Procedure note the patient's finger was digitally blocked with 1% lidocaine plain.  The area was cleansed with Betadine and incision made with a 11. Blade although little or no pus was retrieved.  The patient had a quarter-inch pack placed and tube gauze dressing.  He will be continued on Bactrim as well as given a prescription for Norco if needed for pain.  He is advised to remove the packing in 2 days then start warm soaks.  He is counseled that if he has any worsening symptoms to return to the emergency room immediately.                             Clinical Impression:  Final diagnoses:  [L03.011] Paronychia of finger of right hand (Primary)  [L03.011] Cellulitis of finger of right hand          ED Disposition Condition    Discharge Stable          ED Prescriptions       Medication Sig Dispense Start Date End Date Auth. Provider    sulfamethoxazole-trimethoprim 800-160mg (BACTRIM DS) 800-160 mg Tab Take 1 tablet by mouth 2 (two) times daily. for 5 days 10 tablet 1/27/2024 2/1/2024 Vern Montoya Jr., MD    HYDROcodone-acetaminophen (NORCO) 5-325 mg per tablet Take 1 tablet by mouth every 6 (six) hours as needed. 12 tablet 1/27/2024 -- Vern Montoya Jr., MD          Follow-up Information       Follow up With Specialties Details Why Contact Info    Garo Schumacher MD Family Medicine Schedule an appointment as soon as possible for a visit in 1 week For wound re-check [address]  [address]  [place] [address] [phone]               Jan  Vern BONILLA Jr., MD  01/27/24 1528

## 2024-01-27 NOTE — DISCHARGE INSTRUCTIONS
Keep the hand elevated to minimize swelling and pain.  Ice pack to the finger today.  Continue Bactrim twice daily.  Take Norco every 4-6 hours if needed for pain.  Remove packing in 48 hours then start warm soaks.  Watch for any fever.  Return to the ER for any problems.

## 2024-01-27 NOTE — FIRST PROVIDER EVALUATION
Emergency Department TeleTriage Encounter Note      CHIEF COMPLAINT    Chief Complaint   Patient presents with    Hand Pain     Pt went to urgent care thursday for finger pain, it was lanced and he started abt. The finger is now swollen and not getting better.        VITAL SIGNS   Initial Vitals [01/27/24 1302]   BP Pulse Resp Temp SpO2   (!) 205/109 97 16 98.9 °F (37.2 °C) 97 %      MAP       --            ALLERGIES    Review of patient's allergies indicates:   Allergen Reactions    Versed [midazolam] Hallucinations    Versed [midazolam]        PROVIDER TRIAGE NOTE  28-year-old male with history of type 1 diabetes and autism, presents to the ER with his mother for evaluation due to right 4th distal tip fingernail and action and has not responded to antibiotic.  She states she urgent care on Thursday for finger pain infection.  A I&D what appeared to be a paronychia and was placed on antibiotics the swelling is getting worse the pain is getting worse no improvement with antibiotics.  Patient reports his blood sugar has been running higher than normal.  Average blood sugar of med 200 throughout the day pack per day section started.  No other complaints or concerns.    AAOx3, respirations even and non- labored, stable vitals, normal coloration of skin, sitting upright in triage chair, appears in no acute distress.          ORDERS  Labs Reviewed - No data to display    ED Orders (720h ago, onward)      None              Virtual Visit Note: The provider triage portion of this emergency department evaluation and documentation was performed via [employer], a HIPAA-compliant telemedicine application, in concert with a tele-presenter in the room. A face to face patient evaluation with one of my colleagues will occur once the patient is placed in an emergency department room.      DISCLAIMER: This note was prepared with [employer] voice recognition transcription software. Garbled syntax, mangled pronouns, and other bizarre  constructions may be attributed to that software system.

## 2024-03-04 ENCOUNTER — PATIENT OUTREACH (OUTPATIENT)
Dept: ADMINISTRATIVE | Facility: HOSPITAL | Age: 29
End: 2024-03-04
Payer: MEDICAID

## 2024-03-04 NOTE — PROGRESS NOTES
Health Maintenance Due   Topic Date Due    Pneumococcal Vaccines (Age 0-64) (2 of 2 - PCV) 06/15/2022    Eye Exam  2023    COVID-19 Vaccine ( - -24 season) 2023    TETANUS VACCINE  11/15/2023     Population Health Chart Review & Patient Outreach Details      Further Action Needed If Patient Returns Outreach:      [facility] panel list reviewed. Patient due for annual wellness visit with PCP. Patient last seen 6/15/2021. Portal message sent in regards to scheduling  RACHEL sent to Dr. Aaron Mock for most recent diabetic eye exam records      Updates Requested / Reviewed:     [x]  Care Everywhere    []     []  External Sources ([facility], [facility], [facility], etc.)    [] [facility]   [] [facility]   [] Other:    [x]  Care Team Updated   []  Removed  or Duplicate Orders   [x]  Immunization Reconciliation Completed / Queried    [x] [place]   [] [place]   [] [place]   [] [place]      Health Maintenance Topics Addressed and Outreach Outcomes / Actions Taken:             Breast Cancer Screening []  Mammogram Order Placed    []  Mammogram Screening Scheduled    []  External Records Requested & Care Team Updated if Applicable    []  External Records Uploaded & Care Team Updated if Applicable    []  Pt Declined Scheduling Mammogram    []  Pt Will Schedule with External Provider / Order Routed & Care Team Updated if Applicable              Cervical Cancer Screening []  Pap Smear Scheduled in Primary Care or OBGYN    []  External Records Requested & Care Team Updated if Applicable       []  External Records Uploaded, Care Team Updated, & History Updated if Applicable    []  Patient Declined Scheduling Pap Smear    []  Patient Will Schedule with External Provider & Care Team Updated if Applicable                  Colorectal Cancer Screening []  Colonoscopy Case Request / Referral / Home Test Order Placed    []  External Records Requested & Care Team Updated if Applicable    []  External Records Uploaded, Care Team  Updated, & History Updated if Applicable    []  Patient Declined Completing Colon Cancer Screening    []  Patient Will Schedule with External Provider & Care Team Updated if Applicable    []  Fit Kit Mailed (add the SmartPhrase under additional notes)    []  Reminded Patient to Complete Home Test                Diabetic Eye Exam []  Eye Exam Screening Order Placed    []  Eye Camera Scheduled or Optometry/Ophthalmology Referral Placed    [x]  External Records Requested & Care Team Updated if Applicable    []  External Records Uploaded, Care Team Updated, & History Updated if Applicable    []  Patient Declined Scheduling Eye Exam    []  Patient Will Schedule with External Provider & Care Team Updated if Applicable             Blood Pressure Control []  Primary Care Follow Up Visit Scheduled     []  Remote Blood Pressure Reading Captured    []  Patient Declined Remote Reading or Scheduling Appt - Escalated to PCP    []  Patient Will Call Back or Send Portal Message with Reading                 HbA1c & Other Labs []  Overdue Lab(s) Ordered    []  Overdue Lab(s) Scheduled    []  External Records Uploaded & Care Team Updated if Applicable    []  Primary Care Follow Up Visit Scheduled     []  Reminded Patient to Complete A1c Home Test    []  Patient Declined Scheduling Labs or Will Call Back to Schedule    []  Patient Will Schedule with External Provider / Order Routed, & Care Team Updated if Applicable           Primary Care Appointment []  Primary Care Appt Scheduled    []  Patient Declined Scheduling or Will Call Back to Schedule    []  Pt Established with External Provider, Updated Care Team, & Informed Pt to Notify Payor if Applicable           Medication Adherence /    Statin Use []  Primary Care Appointment Scheduled    []  Patient Reminded to  Prescription    []  Patient Declined, Provider Notified if Needed    []  Sent Provider Message to Review to Evaluate Pt for Statin, Add Exclusion Dx Codes, Document    Exclusion in Problem List, Change Statin Intensity Level to Moderate or High Intensity if Applicable                Osteoporosis Screening []  Dexa Order Placed    []  Dexa Appointment Scheduled    []  External Records Requested & Care Team Updated    []  External Records Uploaded, Care Team Updated, & History Updated if Applicable    []  Patient Declined Scheduling Dexa or Will Call Back to Schedule    []  Patient Will Schedule with External Provider / Order Routed & Care Team Updated if Applicable       Additional Notes:

## 2024-03-04 NOTE — LETTER
AUTHORIZATION FOR RELEASE OF   CONFIDENTIAL INFORMATION    Dear Dr. Mock,    We are seeing Jonathon Daniel, date of birth 1995, in the clinic at [facility]. Garo Schumacher MD is the patient's PCP. Jonathon Daniel has an outstanding lab/procedure at the time we reviewed his chart. In order to help keep his health information updated, he has authorized us to request the following medical record(s):        (  )  MAMMOGRAM                                      (  )  COLONOSCOPY      (  )  PAP SMEAR                                          (  )  OUTSIDE LAB RESULTS     (  )  DEXA SCAN                                          (X)  EYE EXAM            (  )  FOOT EXAM                                          (  )  ENTIRE RECORD     (  )  OUTSIDE IMMUNIZATIONS                 (  )  _______________        ATTN: MAY      Please fax records to Garo Schumacher MD, [phone]     If you have any questions, please contact May at [phone].           Patient Name: Jonathon Daniel  : 1995  Patient Phone #: [phone]

## 2024-03-14 ENCOUNTER — PATIENT OUTREACH (OUTPATIENT)
Dept: ADMINISTRATIVE | Facility: HOSPITAL | Age: 29
End: 2024-03-14
Payer: MEDICAID

## 2024-03-14 ENCOUNTER — PATIENT MESSAGE (OUTPATIENT)
Dept: ADMINISTRATIVE | Facility: HOSPITAL | Age: 29
End: 2024-03-14
Payer: MEDICAID

## 2024-03-14 NOTE — PROGRESS NOTES
Health Maintenance Due   Topic Date Due    Pneumococcal Vaccines (Age 0-64) (2 of 2 - PCV) 06/15/2022    Eye Exam  04/08/2023    COVID-19 Vaccine (5 - 2023-24 season) 09/01/2023    TETANUS VACCINE  11/15/2023     Immunizations - reviewed and updated  Care Everywhere - triggered  Care Teams -   Outreach - [facility] panel list reviewed. Patient due for annual wellness visit with PCP. Patient last seen 6/15/2021. Portal message sent in regards to scheduling

## 2024-04-16 ENCOUNTER — PATIENT MESSAGE (OUTPATIENT)
Dept: ADMINISTRATIVE | Facility: OTHER | Age: 29
End: 2024-04-16
Payer: MEDICAID

## 2024-04-17 ENCOUNTER — OFFICE VISIT (OUTPATIENT)
Dept: DIABETES | Facility: CLINIC | Age: 29
End: 2024-04-17
Payer: MEDICAID

## 2024-04-17 VITALS
DIASTOLIC BLOOD PRESSURE: 90 MMHG | WEIGHT: 255 LBS | HEART RATE: 105 BPM | HEIGHT: 71 IN | OXYGEN SATURATION: 98 % | SYSTOLIC BLOOD PRESSURE: 150 MMHG | BODY MASS INDEX: 35.7 KG/M2

## 2024-04-17 DIAGNOSIS — E88.819 INSULIN RESISTANCE: ICD-10-CM

## 2024-04-17 DIAGNOSIS — Z96.41 INSULIN PUMP IN PLACE: ICD-10-CM

## 2024-04-17 DIAGNOSIS — F84.0 AUTISM SPECTRUM DISORDER: ICD-10-CM

## 2024-04-17 DIAGNOSIS — L03.011 ABSCESS AROUND FINGERNAIL OF RIGHT HAND: ICD-10-CM

## 2024-04-17 DIAGNOSIS — E10.65 TYPE 1 DIABETES MELLITUS WITH HYPERGLYCEMIA: Primary | ICD-10-CM

## 2024-04-17 DIAGNOSIS — L03.011 PARONYCHIA OF FINGER OF RIGHT HAND: ICD-10-CM

## 2024-04-17 DIAGNOSIS — E66.01 CLASS 2 SEVERE OBESITY DUE TO EXCESS CALORIES WITH SERIOUS COMORBIDITY AND BODY MASS INDEX (BMI) OF 35.0 TO 35.9 IN ADULT: ICD-10-CM

## 2024-04-17 PROBLEM — E66.812 CLASS 2 SEVERE OBESITY DUE TO EXCESS CALORIES WITH SERIOUS COMORBIDITY AND BODY MASS INDEX (BMI) OF 35.0 TO 35.9 IN ADULT: Status: ACTIVE | Noted: 2021-06-16

## 2024-04-17 PROCEDURE — 3077F SYST BP >= 140 MM HG: CPT | Mod: CPTII,,, | Performed by: NURSE PRACTITIONER

## 2024-04-17 PROCEDURE — 3080F DIAST BP >= 90 MM HG: CPT | Mod: CPTII,,, | Performed by: NURSE PRACTITIONER

## 2024-04-17 PROCEDURE — 3008F BODY MASS INDEX DOCD: CPT | Mod: CPTII,,, | Performed by: NURSE PRACTITIONER

## 2024-04-17 PROCEDURE — 99214 OFFICE O/P EST MOD 30 MIN: CPT | Mod: S$PBB,,, | Performed by: NURSE PRACTITIONER

## 2024-04-17 PROCEDURE — 99999 PR PBB SHADOW E&M-EST. PATIENT-LVL V: CPT | Mod: PBBFAC,,, | Performed by: NURSE PRACTITIONER

## 2024-04-17 PROCEDURE — 99215 OFFICE O/P EST HI 40 MIN: CPT | Mod: PBBFAC,PN | Performed by: NURSE PRACTITIONER

## 2024-04-17 PROCEDURE — 95251 CONT GLUC MNTR ANALYSIS I&R: CPT | Mod: ,,, | Performed by: NURSE PRACTITIONER

## 2024-04-17 PROCEDURE — 1160F RVW MEDS BY RX/DR IN RCRD: CPT | Mod: CPTII,,, | Performed by: NURSE PRACTITIONER

## 2024-04-17 PROCEDURE — 1159F MED LIST DOCD IN RCRD: CPT | Mod: CPTII,,, | Performed by: NURSE PRACTITIONER

## 2024-04-17 RX ORDER — SULFAMETHOXAZOLE AND TRIMETHOPRIM 800; 160 MG/1; MG/1
1 TABLET ORAL 2 TIMES DAILY
Qty: 14 TABLET | Refills: 0 | Status: SHIPPED | OUTPATIENT
Start: 2024-04-17 | End: 2024-04-17 | Stop reason: ALTCHOICE

## 2024-04-17 RX ORDER — INSULIN ASPART 100 [IU]/ML
INJECTION, SOLUTION INTRAVENOUS; SUBCUTANEOUS
Qty: 15 ML | Refills: 6 | Status: SHIPPED | OUTPATIENT
Start: 2024-04-17

## 2024-04-17 RX ORDER — INSULIN PMP CART,AUT,G6/7,CNTR
1 EACH SUBCUTANEOUS DAILY
Qty: 6 EACH | Refills: 11 | Status: SHIPPED | OUTPATIENT
Start: 2024-04-17

## 2024-04-17 RX ORDER — INSULIN ASPART INJECTION 100 [IU]/ML
INJECTION, SOLUTION SUBCUTANEOUS
Qty: 60 ML | Refills: 11 | Status: SHIPPED | OUTPATIENT
Start: 2024-04-17 | End: 2024-05-20

## 2024-04-17 RX ORDER — INSULIN DETEMIR 100 [IU]/ML
INJECTION, SOLUTION SUBCUTANEOUS
Qty: 15 ML | Refills: 6 | Status: SHIPPED | OUTPATIENT
Start: 2024-04-17

## 2024-04-17 RX ORDER — PEN NEEDLE, DIABETIC 30 GX3/16"
NEEDLE, DISPOSABLE MISCELLANEOUS
Qty: 150 EACH | Refills: 11 | Status: SHIPPED | OUTPATIENT
Start: 2024-04-17

## 2024-04-17 RX ORDER — MUPIROCIN 20 MG/G
OINTMENT TOPICAL
Qty: 22 G | Refills: 0 | OUTPATIENT
Start: 2024-04-17 | End: 2024-04-17

## 2024-04-17 RX ORDER — GLUCAGON 3 MG/1
POWDER NASAL
Qty: 2 EACH | Refills: 1 | Status: SHIPPED | OUTPATIENT
Start: 2024-04-17

## 2024-04-17 NOTE — ASSESSMENT & PLAN NOTE
Due for repeat A1c level   Bg readings are improving on omnipod 5 download   Needs to stay in auto mode more   Reviewed pump optimization with patient  Discussed that he needs to stay in automated mode as much as possible.  Reviewed pump troubleshooting  Discussed hitting the use CGM button AGAIN today   Encouraged to bolus throughout the day if blood sugar readings are running high.  Discussed how to do this      -- Medication Changes:  Continue Omnipod 5.  Remain in auto mode as much as possible    Pump settings: continue current pump settings.   Basal:    MN-6AM: 2.5 units/hr   MN-Noon: 2 units/hr   Noon-MN: 2.35 units/hr       ICR:    MN-6AM: 1: 5 grams   6Am-Noon: 1: 5 grams   Noon--4 PM- 1:4 grams  4PM- MN: 1: 4 grams       ISF  MN-6AM: 1: 20  6Am-Noon: 1: 12  Noon--4 PM- 1:12   4PM- MN: 1: 12       Target:  110    IOB: 3 hours    -- Reviewed goals of therapy are to get the best control we can without hypoglycemia.  -- Reviewed patient's current insulin regimen. Clarified proper insulin dose and timing in relation to meals, etc. Insulin injection sites and proper rotation instructed.   -- Advised frequent self blood glucose monitoring.  Patient encouraged to document glucose results and bring them to every clinic visit. - continue to use Dexcom -- supplies come from pharmacy now-- RX sent to [facility]   -- Hypoglycemia precautions discussed. Instructed on precautions before driving.    -- Call for Bg repeatedly < 70 or > 200.   -- Close adherence to lifestyle changes recommended.   -- Periodic follow ups for eye evaluations, foot care and dental care suggested.      Patient requires a therapeutic CGM and is willing to use therapeutic CGM/SMBG for Necessary frequent adjustments in insulin therapy. Patient is using SMBG for glucose monitoring (4 + times a day).  Patient is having Hypoglycemia and Hypoglycemia Unawareness of <50mg/dl.   I have assessed adherence to CGM regimen and to the plan.

## 2024-04-17 NOTE — PROGRESS NOTES
CC:   Chief Complaint   Patient presents with    Diabetes Mellitus       HPI: Jonathon Daniel is a 28 y.o. male presents for a follow up visit today for the management of T1DM.      He  was diagnosed with Type 1 diabetes in 2009 (14 years old)when he went into DKA with BG reading of 1108. Started on insulin therapy at the time of diagnosis.   He started with the Omnipod within a year of diagnosis of T1DM. He started the Medtronic 670G in 2018. He has also worn the [place] pump and Medtronic 670G pump in the past.   He previously wore a Dexcom personal CGM- G4 and he really liked it.   He does not care for the Medtronic 670G pump or the Guardian sensor.  Reports that he was never able to get the guardian sensor to work right and has never been able to be in auto mode on the 670G.  He is now on the Dexcom G6.  He started the Tandem pump in June 2021.   He is now off the tandem insulin pump in on the Omnipod 5  He started the Omnipod 5 on November 17, 2023    Family hx of diabetes: uncle- T1DM- JOSE ROBERTO   Hospitalized for diabetes: a couple of visits to the ER for hyperglycemia   DKA at diagnosis   In 2020- DKA - pump malfunction     No personal or FH of thyroid cancer or personal of pancreatic cancer or pancreatitis.     -started the Omnipod 5 on November 17, 2023  He is liking the Omnipod 5 in Dexcom integration.      Our last visit was in December of 2023  At that visit was our 1st visit meeting since he had started the Omnipod 5.  He is still in the Omnipod 5 with Dexcom G6 integration.  He has had some issues getting his Dexcom sensors which has caused him to be in manual mode instead of auto mode.  At our last visit we changed his NovoLog to Fiasp   And we adjusted his pump settings.  His averages have gone from the high 190s to 160s  No recent A1c on file  See attached downloads  He does present today with a right ring finger paronychia with abscess---discussed that he needs to go to the emergency room and he does need  a tetanus shot                    DIABETES COMPLICATIONS: none      Diabetes Management Status    ASA:  No    Statin: Not taking  ACE/ARB: Not taking    Screening or Prevention Patient's value Goal Complete/Controlled?   HgA1C Testing and Control   Lab Results   Component Value Date    HGBA1C 7.5 (H) 12/14/2023      Annually/Less than 8% No   Lipid profile : 09/13/2023 Annually No   LDL control Lab Results   Component Value Date    LDLCALC 114 (H) 09/13/2023    Annually/Less than 100 mg/dl  No   Nephropathy screening Lab Results   Component Value Date    LABMICR 11.0 09/24/2018     Lab Results   Component Value Date    PROTEINUA Negative 04/13/2021    Annually No   Blood pressure BP Readings from Last 1 Encounters:   04/17/24 (!) 150/90    Less than 140/90 Yes   Dilated retinal exam : 04/08/2022 - external- [place] --  Annually Yes   Foot exam   : 09/15/2023 Annually Yes       CURRENT A1C:    Hemoglobin A1C   Date Value Ref Range Status   12/14/2023 7.5 (H) <5.7 % of total Hgb Final     Comment:     For someone without known diabetes, a hemoglobin A1c  value of 6.5% or greater indicates that they may have   diabetes and this should be confirmed with a follow-up   test.     For someone with known diabetes, a value <7% indicates   that their diabetes is well controlled and a value   greater than or equal to 7% indicates suboptimal   control. A1c targets should be individualized based on   duration of diabetes, age, comorbid conditions, and   other considerations.     Currently, no consensus exists regarding use of  hemoglobin A1c for diagnosis of diabetes for children.         09/13/2023 7.6 (H) <5.7 % of total Hgb Final     Comment:     For someone without known diabetes, a hemoglobin A1c  value of 6.5% or greater indicates that they may have   diabetes and this should be confirmed with a follow-up   test.     For someone with known diabetes, a value <7% indicates   that their diabetes is well controlled and a value    greater than or equal to 7% indicates suboptimal   control. A1c targets should be individualized based on   duration of diabetes, age, comorbid conditions, and   other considerations.     Currently, no consensus exists regarding use of  hemoglobin A1c for diagnosis of diabetes for children.         2023 6.6 (H) <5.7 % of total Hgb Final     Comment:     For someone without known diabetes, a hemoglobin A1c  value of 6.5% or greater indicates that they may have   diabetes and this should be confirmed with a follow-up   test.     For someone with known diabetes, a value <7% indicates   that their diabetes is well controlled and a value   greater than or equal to 7% indicates suboptimal   control. A1c targets should be individualized based on   duration of diabetes, age, comorbid conditions, and   other considerations.     Currently, no consensus exists regarding use of  hemoglobin A1c for diagnosis of diabetes for children.             GOAL A1C: 6.5% without hypoglycemia     DM MEDICATIONS USED IN THE PAST: Medtronic 670G pump   Omnipod   [place]   Dexcom G4   Levemir   Lantus   Novolog   Humalog   Dexcom G6   Tandem pump   Omnipod 5   Fiasp       CURRENT DIABETES MEDICATIONS:   Insulin Pump: Omnipod 5 with Fiasp     Pump settings: continue current pump settings.   Basal:  Adjusted basal rates when he is not in automated mode  MN-6AM: 2.5 units/hr   MN-Noon: 2 units/hr   Noon-MN: 2.35 units/hr         ICR:  Tighten carbohydrate ratios  MN-6AM: 1: 5 grams   6Am-Noon: 1: 5 grams   Noon--4 PM- 1:4 grams  4PM- MN: 1: 4 grams         ISF  MN-6AM: 1: 20  6Am-Noon: 1: 12  Noon--4 PM- 1:12   4PM- MN: 1: 12         Target:  110     IOB: 3 hours      Temp basals: none     Pump site change: q 1-2 days  Insulin TDD: 100.6 units   Basal 49%   Bolus 51%,  CHO intake:165.7ams of carbohydrates   Using the bolus wizard: yes   Overridin%   Automated mode 52% of the time manual mode 48% of the time    Back up Lantus/Levemir:   Back up Rx at pharmacy-- Levemir     Patient's pump was downloaded in clinic today and reviewed with patient.   Please see attached documents for pump download.     Pump supplies from [facility]       BLOOD GLUCOSE MONITORING:    Sensor type: Dexcom G6   Average BG readin  Time in range:  70%  23% high, 7% very high, 0% low, 0% very low  Site change:  q10 days    Sensor was downloaded in clinic today and reviewed with patient.   Please see attached document for download.     Supplies from [facility]      HYPOGLYCEMIA: rarely   Has gone as low as 50's  May have hypoglycemia unawareness- difficult due to hx of autism and hx of anoxic brain injury.   Glucagon kit: [employer]i   Medic alert bracelet: yes- bracelet         MEALS: eating 2-3 meals per day   Mom reports they have cut back on carbohydrate intake.  He is only entering in about 96 g of carbs per day which is significantly less than previous entries  Drinks: diet sodas   Water with SF packets        CURRENT EXERCISE:  No formal       Review of Systems  Review of Systems   Constitutional:  Negative for appetite change and fatigue.   HENT:  Negative for trouble swallowing.    Eyes:  Negative for visual disturbance.   Respiratory:  Negative for shortness of breath.    Cardiovascular:  Negative for chest pain.   Gastrointestinal:  Negative for nausea.   Endocrine: Negative for polydipsia, polyphagia and polyuria.   Genitourinary:         No Nocturia    Skin:  Positive for wound.   Neurological:  Negative for numbness.       Physical Exam   Physical Exam  Vitals and nursing note reviewed.   Constitutional:       Appearance: He is well-developed. He is obese.   HENT:      Head: Normocephalic and atraumatic.      Right Ear: External ear normal.      Left Ear: External ear normal.      Nose: Nose normal.   Neck:      Thyroid: No thyromegaly.      Trachea: No tracheal deviation.   Cardiovascular:      Rate and Rhythm: Normal rate and regular rhythm.      Heart sounds: No  murmur heard.  Pulmonary:      Effort: Pulmonary effort is normal. No respiratory distress.      Breath sounds: Normal breath sounds.   Abdominal:      Palpations: Abdomen is soft.      Tenderness: There is no abdominal tenderness.      Hernia: No hernia is present.   Musculoskeletal:      Cervical back: Normal range of motion and neck supple.   Skin:     General: Skin is warm and dry.      Capillary Refill: Capillary refill takes less than 2 seconds.      Findings: Abscess and wound present. No rash.      Comments: Insulin pump sites and Dexcom sites are normal appearing. No lipo hypertropthy or atrophy     Neurological:      Mental Status: He is alert and oriented to person, place, and time.      Cranial Nerves: No cranial nerve deficit.   Psychiatric:         Behavior: Behavior normal.         Judgment: Judgment normal.                     FOOT EXAMINATION: Appropriate footwear     Protective Sensation (w/ 10 gram monofilament):  Right: Intact  Left: Intact    Visual Inspection:  Normal -  Bilateral and Nails Intact - without Evidence of Foot Deformity- Bilateral    Pedal Pulses:   Right: Present  Left: Present    Posterior Tibialis Pulses:   Right:Present  Left: Present          Lab Results   Component Value Date    TSH 2.04 09/13/2023           Type 1 diabetes mellitus with hyperglycemia  Due for repeat A1c level   Bg readings are improving on omnipod 5 download   Needs to stay in auto mode more   Reviewed pump optimization with patient  Discussed that he needs to stay in automated mode as much as possible.  Reviewed pump troubleshooting  Discussed hitting the use CGM button AGAIN today   Encouraged to bolus throughout the day if blood sugar readings are running high.  Discussed how to do this      -- Medication Changes:  Continue Omnipod 5.  Remain in auto mode as much as possible    Pump settings: continue current pump settings.   Basal:    MN-6AM: 2.5 units/hr   MN-Noon: 2 units/hr   Noon-MN: 2.35 units/hr        ICR:    MN-6AM: 1: 5 grams   6Am-Noon: 1: 5 grams   Noon--4 PM- 1:4 grams  4PM- MN: 1: 4 grams       ISF  MN-6AM: 1: 20  6Am-Noon: 1: 12  Noon--4 PM- 1:12   4PM- MN: 1: 12       Target:  110    IOB: 3 hours    -- Reviewed goals of therapy are to get the best control we can without hypoglycemia.  -- Reviewed patient's current insulin regimen. Clarified proper insulin dose and timing in relation to meals, etc. Insulin injection sites and proper rotation instructed.   -- Advised frequent self blood glucose monitoring.  Patient encouraged to document glucose results and bring them to every clinic visit. - continue to use Dexcom -- supplies come from pharmacy now-- [facility]   -- Hypoglycemia precautions discussed. Instructed on precautions before driving.    -- Call for Bg repeatedly < 70 or > 200.   -- Close adherence to lifestyle changes recommended.   -- Periodic follow ups for eye evaluations, foot care and dental care suggested.      Patient requires a therapeutic CGM and is willing to use therapeutic CGM/SMBG for Necessary frequent adjustments in insulin therapy. Patient is using SMBG for glucose monitoring (4 + times a day).  Patient is having Hypoglycemia and Hypoglycemia Unawareness of <50mg/dl.   I have assessed adherence to CGM regimen and to the plan.        Insulin pump in place  See above     Class 2 severe obesity due to excess calories with serious comorbidity and body mass index (BMI) of 35.0 to 35.9 in adult  Body mass index is 35.57 kg/m².  Increases insulin resistance.   Discussed DM diet and exercise.       Insulin resistance  TDD of  100 units   May benefit from Metformin- can consider in the future     Autism spectrum disorder  May hinder overall diabetes management  Has strong family support         Regarding his finger paronychia I am recommending that he go to the ER for evaluation of this as it needs I and D  I also recommend finger x-ray  And I recommend an updated tetanus  vaccine  I did send over Bactrim DS and mupirocin ointment.  Discussed home wound care            Pump backup plan    If the insulin pump is non functional and discontinued for anticipated more than 20 hours, please give daily injections of:   Long acting insulin Levemir  28 units BID   Short acting insulin Novolog 1:5-1:6 for meals according to carb ratios and sensitivity factor in the pump.     When the insulin pump is restarted, do not restart basal rates until at least 22 hours after the last long acting insulin injection. You can set a 0% temporary basal setting that will last until this time and use your pump to bolus for meals and correction.     For any technical insulin pump issues, please contact the insulin pump company; the toll free number is printed on the label on the back of the insulin pump.       If your sugar is running high for a few hours and does not respond to two correction doses from the insulin pump, it may mean that you have a bad pump site and the site should be changed         I spent a total of 30 minutes on the day of the visit.This includes face to face time and non-face to face time preparing to see the patient (eg, review of tests), obtaining and/or reviewing separately obtained history, documenting clinical information in the electronic or other health record, independently interpreting results and communicating results to the patient/family/caregiver, or care coordinator.            Follow up in about 4 months (around 8/17/2024).   ER today for finger -- I&D   Labs at [facility] when he can   See me in 4 months       No orders of the defined types were placed in this encounter.      Recommendations were discussed with the patient in detail  The patient verbalized understanding and agrees with the plan outlined as above.     This note was partly generated with Leostream voice recognition software. I apologize for any possible typographical errors.

## 2024-04-17 NOTE — ASSESSMENT & PLAN NOTE
Body mass index is 35.57 kg/m².  Increases insulin resistance.   Discussed DM diet and exercise.

## 2024-04-17 NOTE — PATIENT INSTRUCTIONS
Pump backup plan    If the insulin pump is non functional and discontinued for anticipated more than 20 hours, please give daily injections of:   Long acting insulin Levemir  28 units BID   Short acting insulin Novolog 1:5-1:6 for meals according to carb ratios and sensitivity factor in the pump.     When the insulin pump is restarted, do not restart basal rates until at least 22 hours after the last long acting insulin injection. You can set a 0% temporary basal setting that will last until this time and use your pump to bolus for meals and correction.     For any technical insulin pump issues, please contact the insulin pump company; the toll free number is printed on the label on the back of the insulin pump.       If your sugar is running high for a few hours and does not respond to two correction doses from the insulin pump, it may mean that you have a bad pump site and the site should be changed         I spent a total of 30 minutes on the day of the visit.This includes face to face time and non-face to face time preparing to see the patient (eg, review of tests), obtaining and/or reviewing separately obtained history, documenting clinical information in the electronic or other health record, independently interpreting results and communicating results to the patient/family/caregiver, or care coordinator.

## 2024-04-22 ENCOUNTER — PATIENT OUTREACH (OUTPATIENT)
Dept: ADMINISTRATIVE | Facility: HOSPITAL | Age: 29
End: 2024-04-22
Payer: MEDICAID

## 2024-04-22 NOTE — PROGRESS NOTES
Health Maintenance Due   Topic Date Due    Pneumococcal Vaccines (Age 0-64) (2 of 2 - PCV) 06/15/2022    Eye Exam  04/08/2023    COVID-19 Vaccine (5 - 2023-24 season) 09/01/2023    TETANUS VACCINE  11/15/2023        Chart review done.    updated.   Immunizations reviewed & updated.   Care Everywhere updated.

## 2024-05-02 ENCOUNTER — PATIENT MESSAGE (OUTPATIENT)
Dept: DIABETES | Facility: CLINIC | Age: 29
End: 2024-05-02
Payer: MEDICAID

## 2024-05-02 DIAGNOSIS — E10.65 TYPE 1 DIABETES MELLITUS WITH HYPERGLYCEMIA: ICD-10-CM

## 2024-05-02 RX ORDER — BLOOD-GLUCOSE TRANSMITTER
1 EACH MISCELLANEOUS
Qty: 1 EACH | Refills: 4 | Status: SHIPPED | OUTPATIENT
Start: 2024-05-02 | End: 2025-05-02

## 2024-05-02 RX ORDER — BLOOD-GLUCOSE SENSOR
1 EACH MISCELLANEOUS
Qty: 3 EACH | Refills: 11 | Status: SHIPPED | OUTPATIENT
Start: 2024-05-02 | End: 2025-05-02

## 2024-05-06 ENCOUNTER — OFFICE VISIT (OUTPATIENT)
Dept: PRIMARY CARE CLINIC | Facility: CLINIC | Age: 29
End: 2024-05-06
Payer: MEDICAID

## 2024-05-06 VITALS
OXYGEN SATURATION: 98 % | DIASTOLIC BLOOD PRESSURE: 88 MMHG | HEIGHT: 71 IN | BODY MASS INDEX: 35.31 KG/M2 | SYSTOLIC BLOOD PRESSURE: 130 MMHG | WEIGHT: 252.19 LBS | HEART RATE: 94 BPM | RESPIRATION RATE: 17 BRPM

## 2024-05-06 DIAGNOSIS — L03.011 PARONYCHIA OF FINGER, RIGHT: Primary | ICD-10-CM

## 2024-05-06 DIAGNOSIS — L60.0 INGROWN NAIL OF RIGHT RING FINGER: ICD-10-CM

## 2024-05-06 PROCEDURE — 3008F BODY MASS INDEX DOCD: CPT | Mod: CPTII,,, | Performed by: FAMILY MEDICINE

## 2024-05-06 PROCEDURE — 99214 OFFICE O/P EST MOD 30 MIN: CPT | Mod: S$PBB,,, | Performed by: FAMILY MEDICINE

## 2024-05-06 PROCEDURE — 99999 PR PBB SHADOW E&M-EST. PATIENT-LVL IV: CPT | Mod: PBBFAC,,, | Performed by: FAMILY MEDICINE

## 2024-05-06 PROCEDURE — 1159F MED LIST DOCD IN RCRD: CPT | Mod: CPTII,,, | Performed by: FAMILY MEDICINE

## 2024-05-06 PROCEDURE — 99214 OFFICE O/P EST MOD 30 MIN: CPT | Mod: PBBFAC,PN | Performed by: FAMILY MEDICINE

## 2024-05-06 PROCEDURE — 3075F SYST BP GE 130 - 139MM HG: CPT | Mod: CPTII,,, | Performed by: FAMILY MEDICINE

## 2024-05-06 PROCEDURE — 3079F DIAST BP 80-89 MM HG: CPT | Mod: CPTII,,, | Performed by: FAMILY MEDICINE

## 2024-05-06 PROCEDURE — 1160F RVW MEDS BY RX/DR IN RCRD: CPT | Mod: CPTII,,, | Performed by: FAMILY MEDICINE

## 2024-05-06 RX ORDER — AMOXICILLIN AND CLAVULANATE POTASSIUM 875; 125 MG/1; MG/1
1 TABLET, FILM COATED ORAL 2 TIMES DAILY
Qty: 20 TABLET | Refills: 0 | Status: SHIPPED | OUTPATIENT
Start: 2024-05-06 | End: 2024-05-16

## 2024-05-06 RX ORDER — SULFAMETHOXAZOLE AND TRIMETHOPRIM 800; 160 MG/1; MG/1
1 TABLET ORAL 2 TIMES DAILY
COMMUNITY
Start: 2024-04-17 | End: 2024-05-06 | Stop reason: ALTCHOICE

## 2024-05-06 NOTE — PROGRESS NOTES
"Subjective:       Patient ID: Jonathon Daniel is a 28 y.o. male.    Chief Complaint: Follow-up (Infection left ring finger 3-4 months, previously lanced in [place] [facility] 1/27/24 infection has came back)    Has been dealing with an infection on the tip of his right 4th finger for the past several months.  Has been on multiple rounds of Bactrim without significant improvement.  Only time he got moderate improvement was after a paronychia was drained in the emergency room several months ago.  Most recently, took a course of Bactrim and then switched to Keflex approximately 5 days ago.  Applying mupirocin ointment.  No drainage.  No fevers or chills.  Insists that he does not bite or pick his nails.  Blood sugars have been well controlled    Follow-up  Pertinent negatives include no chills, fever, nausea or vomiting.     Review of Systems   Constitutional:  Negative for chills and fever.   Gastrointestinal:  Negative for nausea and vomiting.   Skin:  Positive for color change and wound.   Allergic/Immunologic: Negative for immunocompromised state.       Objective:      Vitals:    05/06/24 1030   BP: 130/88   BP Location: Right arm   Patient Position: Sitting   BP Method: Medium (Manual)   Pulse: 94   Resp: 17   SpO2: 98%   Weight: 114.4 kg (252 lb 3.3 oz)   Height: 5' 11" (1.803 m)     BP Readings from Last 5 Encounters:   05/06/24 130/88   04/17/24 (!) 159/98   04/17/24 (!) 150/90   01/27/24 130/68   12/28/23 132/82     Wt Readings from Last 5 Encounters:   05/06/24 114.4 kg (252 lb 3.3 oz)   04/17/24 115.3 kg (254 lb 3.1 oz)   04/17/24 115.7 kg (255 lb)   01/27/24 101.6 kg (224 lb)   12/28/23 112.5 kg (248 lb)     Physical Exam  Constitutional:       General: He is not in acute distress.     Appearance: Normal appearance. He is well-developed.   Pulmonary:      Effort: No respiratory distress.   Musculoskeletal:        Arms:    Neurological:      Mental Status: He is alert and oriented to person, place, and time. "   Psychiatric:         Behavior: Behavior normal.         Lab Results   Component Value Date    WBC 6.15 01/27/2024    HGB 16.2 01/27/2024    HCT 47.1 01/27/2024     01/27/2024    CHOL 172 09/13/2023    TRIG 123 09/13/2023    HDL 35 (L) 09/13/2023    ALT 43 01/27/2024    AST 36 01/27/2024     (L) 01/27/2024    K 3.9 01/27/2024     01/27/2024    CREATININE 1.2 01/27/2024    BUN 15 01/27/2024    CO2 20 (L) 01/27/2024    TSH 2.04 09/13/2023    HGBA1C 7.5 (H) 12/14/2023    MICROALBUR 0.8 09/13/2023      Assessment:       1. Paronychia of finger, right    2. Ingrown nail of right ring finger        Plan:       Paronychia of finger, right  -     amoxicillin-clavulanate 875-125mg (AUGMENTIN) 875-125 mg per tablet; Take 1 tablet by mouth 2 (two) times daily. for 10 days  Dispense: 20 tablet; Refill: 0  Switch to antibiotic with anaerobic coverage  Ingrown nail of right ring finger  Needs follow-up with Derm if no improvement    Medication List with Changes/Refills   New Medications    AMOXICILLIN-CLAVULANATE 875-125MG (AUGMENTIN) 875-125 MG PER TABLET    Take 1 tablet by mouth 2 (two) times daily. for 10 days   Current Medications    BLOOD-GLUCOSE SENSOR (DEXCOM G6 SENSOR) CHRISTOPHER    1 Device by Misc.(Non-Drug; Combo Route) route every 10 days.    BLOOD-GLUCOSE TRANSMITTER (DEXCOM G6 TRANSMITTER) CHRISTOPHER    1 Device by Misc.(Non-Drug; Combo Route) route every 3 (three) months.    CETIRIZINE (ZYRTEC) 10 MG TABLET    Take 10 mg by mouth once daily.    DOCUSATE SODIUM (STOOL SOFTENER ORAL)    Take by mouth once daily.    GLUCAGON (BAQSIMI) 3 MG/ACTUATION SPRY    3 mg (one actuation) into a single nostril; if no response, may repeat in 15 minutes using a new intranasal device.    INSULIN ASPART U-100 (NOVOLOG FLEXPEN U-100 INSULIN) 100 UNIT/ML (3 ML) INPN PEN    To have on file in case pump failure. Insulin to carbohydrate ratio 1:5 TID AC + correction scale. MAX TDD of 50 units    INSULIN ASPART, NIACINAMIDE,  "(FIASP U-100 INSULIN) 100 UNIT/ML SOLN    TO USE CONTINUOUSLY WITH HIS INSULIN PUMP. MAX TDD  UNITS    INSULIN DETEMIR U-100, LEVEMIR, (LEVEMIR FLEXTOUCH U100 INSULIN) 100 UNIT/ML (3 ML) INPN PEN    Inject 48 units daily. To have on file in case of insulin pump failure.    INSULIN PUMP CART,AUTOMATED,BT (OMNIPOD 5 G6 PODS, GEN 5,) CRTG    Inject 1 Device into the skin once daily.    MUPIROCIN (BACTROBAN) 2 % OINTMENT    Apply topically 3 (three) times daily.    PEN NEEDLE, DIABETIC (BD ULTRA-FINE ELLEN PEN NEEDLE) 32 GAUGE X 5/32" NDLE    To use with back up Levemir and Novolog injections if needed.   Discontinued Medications    CEPHALEXIN (KEFLEX) 500 MG CAPSULE    Take 1 capsule (500 mg total) by mouth every 8 (eight) hours. for 5 days    SULFAMETHOXAZOLE-TRIMETHOPRIM 800-160MG (BACTRIM DS) 800-160 MG TAB    Take 1 tablet by mouth 2 (two) times daily.           "

## 2024-05-20 DIAGNOSIS — E10.65 TYPE 1 DIABETES MELLITUS WITH HYPERGLYCEMIA: ICD-10-CM

## 2024-05-20 RX ORDER — INSULIN ASPART INJECTION 100 [IU]/ML
INJECTION, SOLUTION SUBCUTANEOUS
Qty: 60 ML | Refills: 3 | Status: SHIPPED | OUTPATIENT
Start: 2024-05-20

## 2024-07-17 ENCOUNTER — PATIENT OUTREACH (OUTPATIENT)
Dept: ADMINISTRATIVE | Facility: HOSPITAL | Age: 29
End: 2024-07-17
Payer: MEDICAID

## 2024-07-17 NOTE — PROGRESS NOTES
Health Maintenance Due   Topic Date Due    Pneumococcal Vaccines (Age 0-64) (2 of 2 - PCV) 06/15/2022    Eye Exam  04/08/2023    COVID-19 Vaccine (5 - 2023-24 season) 09/01/2023    TETANUS VACCINE  11/15/2023    Hemoglobin A1c  06/14/2024    Foot Exam  09/15/2024     Immunizations - reviewed and updated   Care Everywhere - triggered   Care Teams -   Outreach - Chart review done. RACHEL sent to Dr. Mock for most recent diabetic eye exam records

## 2024-07-17 NOTE — LETTER
AUTHORIZATION FOR RELEASE OF   CONFIDENTIAL INFORMATION    Dear Dr. Mock,    We are seeing Jonahton Daniel, date of birth 1995, in the clinic at [facility]. Garo Schumacher MD is the patient's PCP. Jonathon Daniel has an outstanding lab/procedure at the time we reviewed his chart. In order to help keep his health information updated, he has authorized us to request the following medical record(s):        (  )  MAMMOGRAM                                      (  )  COLONOSCOPY      (  )  PAP SMEAR                                          (  )  OUTSIDE LAB RESULTS     (  )  DEXA SCAN                                          (X)  EYE EXAM            (  )  FOOT EXAM                                          (  )  ENTIRE RECORD     (  )  OUTSIDE IMMUNIZATIONS                 (  )  _______________       ATTN: MAY      Please fax records to Garo Schumacher MD, [phone]     If you have any questions, please contact May at [phone].           Patient Name: Jonathon Daniel  : 1995  Patient Phone #: [phone]

## 2024-08-16 ENCOUNTER — PATIENT MESSAGE (OUTPATIENT)
Dept: DIABETES | Facility: CLINIC | Age: 29
End: 2024-08-16
Payer: MEDICAID

## 2024-08-16 ENCOUNTER — TELEPHONE (OUTPATIENT)
Dept: DIABETES | Facility: CLINIC | Age: 29
End: 2024-08-16
Payer: MEDICAID

## 2024-08-16 DIAGNOSIS — E10.65 TYPE 1 DIABETES MELLITUS WITH HYPERGLYCEMIA: Primary | ICD-10-CM

## 2024-08-19 ENCOUNTER — OFFICE VISIT (OUTPATIENT)
Dept: DIABETES | Facility: CLINIC | Age: 29
End: 2024-08-19
Payer: MEDICAID

## 2024-08-19 VITALS
HEIGHT: 71 IN | OXYGEN SATURATION: 98 % | BODY MASS INDEX: 35.3 KG/M2 | HEART RATE: 81 BPM | DIASTOLIC BLOOD PRESSURE: 83 MMHG | SYSTOLIC BLOOD PRESSURE: 129 MMHG | WEIGHT: 252.13 LBS

## 2024-08-19 DIAGNOSIS — Z96.41 INSULIN PUMP IN PLACE: ICD-10-CM

## 2024-08-19 DIAGNOSIS — F84.0 AUTISM SPECTRUM DISORDER: ICD-10-CM

## 2024-08-19 DIAGNOSIS — Z46.81 INSULIN PUMP FITTING OR ADJUSTMENT: ICD-10-CM

## 2024-08-19 DIAGNOSIS — Z71.9 HEALTH EDUCATION/COUNSELING: ICD-10-CM

## 2024-08-19 DIAGNOSIS — E66.01 CLASS 2 SEVERE OBESITY DUE TO EXCESS CALORIES WITH SERIOUS COMORBIDITY AND BODY MASS INDEX (BMI) OF 35.0 TO 35.9 IN ADULT: ICD-10-CM

## 2024-08-19 DIAGNOSIS — E88.819 INSULIN RESISTANCE: ICD-10-CM

## 2024-08-19 DIAGNOSIS — E10.65 TYPE 1 DIABETES MELLITUS WITH HYPERGLYCEMIA: Primary | ICD-10-CM

## 2024-08-19 PROCEDURE — 3008F BODY MASS INDEX DOCD: CPT | Mod: CPTII,,, | Performed by: NURSE PRACTITIONER

## 2024-08-19 PROCEDURE — 99214 OFFICE O/P EST MOD 30 MIN: CPT | Mod: S$PBB,,, | Performed by: NURSE PRACTITIONER

## 2024-08-19 PROCEDURE — 1159F MED LIST DOCD IN RCRD: CPT | Mod: CPTII,,, | Performed by: NURSE PRACTITIONER

## 2024-08-19 PROCEDURE — 3079F DIAST BP 80-89 MM HG: CPT | Mod: CPTII,,, | Performed by: NURSE PRACTITIONER

## 2024-08-19 PROCEDURE — 95251 CONT GLUC MNTR ANALYSIS I&R: CPT | Mod: ,,, | Performed by: NURSE PRACTITIONER

## 2024-08-19 PROCEDURE — 1160F RVW MEDS BY RX/DR IN RCRD: CPT | Mod: CPTII,,, | Performed by: NURSE PRACTITIONER

## 2024-08-19 PROCEDURE — 99999 PR PBB SHADOW E&M-EST. PATIENT-LVL IV: CPT | Mod: PBBFAC,,, | Performed by: NURSE PRACTITIONER

## 2024-08-19 PROCEDURE — 99214 OFFICE O/P EST MOD 30 MIN: CPT | Mod: PBBFAC,PN | Performed by: NURSE PRACTITIONER

## 2024-08-19 PROCEDURE — 3074F SYST BP LT 130 MM HG: CPT | Mod: CPTII,,, | Performed by: NURSE PRACTITIONER

## 2024-08-19 RX ORDER — INSULIN ASPART 100 [IU]/ML
INJECTION, SOLUTION INTRAVENOUS; SUBCUTANEOUS
Qty: 15 ML | Refills: 6 | Status: SHIPPED | OUTPATIENT
Start: 2024-08-19

## 2024-08-19 RX ORDER — INSULIN ASPART INJECTION 100 [IU]/ML
INJECTION, SOLUTION SUBCUTANEOUS
Qty: 60 ML | Refills: 11 | Status: SHIPPED | OUTPATIENT
Start: 2024-08-19

## 2024-08-19 RX ORDER — BLOOD-GLUCOSE TRANSMITTER
1 EACH MISCELLANEOUS
Qty: 1 EACH | Refills: 4 | Status: SHIPPED | OUTPATIENT
Start: 2024-08-19 | End: 2025-08-19

## 2024-08-19 RX ORDER — INSULIN DETEMIR 100 [IU]/ML
INJECTION, SOLUTION SUBCUTANEOUS
Qty: 15 ML | Refills: 6 | Status: SHIPPED | OUTPATIENT
Start: 2024-08-19

## 2024-08-19 RX ORDER — INSULIN PMP CART,AUT,G6/7,CNTR
1 EACH SUBCUTANEOUS DAILY
Qty: 6 EACH | Refills: 11 | Status: SHIPPED | OUTPATIENT
Start: 2024-08-19

## 2024-08-19 RX ORDER — BLOOD-GLUCOSE SENSOR
1 EACH MISCELLANEOUS
Qty: 3 EACH | Refills: 11 | Status: SHIPPED | OUTPATIENT
Start: 2024-08-19 | End: 2025-08-19

## 2024-08-19 RX ORDER — PEN NEEDLE, DIABETIC 30 GX3/16"
NEEDLE, DISPOSABLE MISCELLANEOUS
Qty: 150 EACH | Refills: 11 | Status: SHIPPED | OUTPATIENT
Start: 2024-08-19

## 2024-08-19 NOTE — ASSESSMENT & PLAN NOTE
Body mass index is 35.16 kg/m².  Increases insulin resistance.   Discussed DM diet and exercise.

## 2024-08-19 NOTE — PROGRESS NOTES
CC:   Chief Complaint   Patient presents with    Diabetes Mellitus       HPI: Jonathon Daniel is a 28 y.o. male presents for a follow up visit today for the management of T1DM.      He  was diagnosed with Type 1 diabetes in 2009 (14 years old)when he went into DKA with BG reading of 1108. Started on insulin therapy at the time of diagnosis.   He started with the Omnipod within a year of diagnosis of T1DM. He started the Medtronic 670G in 2018. He has also worn the Stevenson pump and Medtronic 670G pump in the past.   He previously wore a Dexcom personal CGM- G4 and he really liked it.   He does not care for the Medtronic 670G pump or the Guardian sensor.  Reports that he was never able to get the guardian sensor to work right and has never been able to be in auto mode on the 670G.  He is now on the Dexcom G6.  He started the Tandem pump in June 2021.   He is now off the tandem insulin pump in on the Omnipod 5  He started the Omnipod 5 on November 17, 2023    Family hx of diabetes: uncle- T1DM- JOSE ROBERTO   Hospitalized for diabetes: a couple of visits to the ER for hyperglycemia   DKA at diagnosis   In 2020- DKA - pump malfunction     No personal or FH of thyroid cancer or personal of pancreatic cancer or pancreatitis.     -started the Omnipod 5 on November 17, 2023  He is liking the Omnipod 5 in Dexcom integration.      Our last visit was in April of 2024  At that visit we discussed that he needed to remain in auto mode as much as possible  Continued his current settings  Continued his Dexcom G6  He had lab work done today at [facility] so the labs are still pending and I do not have the results yet  See attached Omnipod download which has Dexcom readings  He is having some hypoglycemia---he reports this is generally due to miss calculation of carbohydrates  He does struggle with carbohydrate counting that times but he prefers to stay with carbohydrate counting as opposed to using set doses  He has not been using the use sensor  button  He may be interested in upgrading to the Dexcom G7 when available with his Omnipod  He presents with his dad                    DIABETES COMPLICATIONS: none      Diabetes Management Status    ASA:  No    Statin: Not taking  ACE/ARB: Not taking    Screening or Prevention Patient's value Goal Complete/Controlled?   HgA1C Testing and Control   Lab Results   Component Value Date    HGBA1C 7.5 (H) 12/14/2023      Annually/Less than 8% No   Lipid profile : 09/13/2023 Annually No   LDL control Lab Results   Component Value Date    LDLCALC 114 (H) 09/13/2023    Annually/Less than 100 mg/dl  No   Nephropathy screening Lab Results   Component Value Date    LABMICR 11.0 09/24/2018     Lab Results   Component Value Date    PROTEINUA Negative 04/13/2021    Annually No   Blood pressure BP Readings from Last 1 Encounters:   08/19/24 129/83    Less than 140/90 Yes   Dilated retinal exam : 04/08/2022 - external- [place] --  Annually Yes   Foot exam   : 09/15/2023 Annually Yes       CURRENT A1C:    Hemoglobin A1C   Date Value Ref Range Status   12/14/2023 7.5 (H) <5.7 % of total Hgb Final     Comment:     For someone without known diabetes, a hemoglobin A1c  value of 6.5% or greater indicates that they may have   diabetes and this should be confirmed with a follow-up   test.     For someone with known diabetes, a value <7% indicates   that their diabetes is well controlled and a value   greater than or equal to 7% indicates suboptimal   control. A1c targets should be individualized based on   duration of diabetes, age, comorbid conditions, and   other considerations.     Currently, no consensus exists regarding use of  hemoglobin A1c for diagnosis of diabetes for children.         09/13/2023 7.6 (H) <5.7 % of total Hgb Final     Comment:     For someone without known diabetes, a hemoglobin A1c  value of 6.5% or greater indicates that they may have   diabetes and this should be confirmed with a follow-up   test.     For  someone with known diabetes, a value <7% indicates   that their diabetes is well controlled and a value   greater than or equal to 7% indicates suboptimal   control. A1c targets should be individualized based on   duration of diabetes, age, comorbid conditions, and   other considerations.     Currently, no consensus exists regarding use of  hemoglobin A1c for diagnosis of diabetes for children.         2023 6.6 (H) <5.7 % of total Hgb Final     Comment:     For someone without known diabetes, a hemoglobin A1c  value of 6.5% or greater indicates that they may have   diabetes and this should be confirmed with a follow-up   test.     For someone with known diabetes, a value <7% indicates   that their diabetes is well controlled and a value   greater than or equal to 7% indicates suboptimal   control. A1c targets should be individualized based on   duration of diabetes, age, comorbid conditions, and   other considerations.     Currently, no consensus exists regarding use of  hemoglobin A1c for diagnosis of diabetes for children.             GOAL A1C: 6.5% without hypoglycemia     DM MEDICATIONS USED IN THE PAST: Medtronic 670G pump   Omnipod   [place]   Dexcom G4   Levemir   Lantus   Novolog   Humalog   Dexcom G6   Tandem pump   Omnipod 5   Fiasp         CURRENT DIABETES MEDICATIONS:   Insulin Pump: Omnipod 5 with Fiasp  Pump settings:   Basal:    MN-6AM: 2.5 units/hr   MN-Noon: 2 units/hr   Noon-MN: 2.35 units/hr         ICR:    MN-6AM: 1: 5 grams   6Am-Noon: 1: 5 grams   Noon--4 PM- 1:4 grams  4PM- MN: 1: 4 grams         ISF  MN-6AM: 1: 20  6Am-Noon: 1: 12  Noon--4 PM- 1:12   4PM- MN: 1: 12         Target:  110     IOB: 3 hours      Temp basals: none     Pump site change: q 1-2 days  Insulin TDD: 98.7 units   Basal 49%   Bolus 51%,  CHO intake:98.7 grams of carbohydrates   Using the bolus wizard: yes   Overridin%   Automated mode 100%     Back up Lantus/Levemir:  Back up Rx at pharmacy-- Levemir     Patient's  pump was downloaded in clinic today and reviewed with patient.   Please see attached documents for pump download.     Pump supplies from [employer]       BLOOD GLUCOSE MONITORING:    Sensor type: Dexcom G6   Average BG reading: 160  Time in range:  67%  22% high, 8% very high, 3% low, 0% very low  Site change:  q10 days    Sensor was downloaded in clinic today and reviewed with patient.   Please see attached document for download.     Supplies from [employer]      HYPOGLYCEMIA: 3% low seen on download-- reports miscalculating carbohydrate sometimes  Sometimes due to insulin stacking as he is not hitting the use CGM button   Has gone as low as 50's  May have hypoglycemia unawareness- difficult due to hx of autism and hx of anoxic brain injury.   Glucagon kit: Baqsimi   Medic alert bracelet: yes- bracelet         MEALS: eating 2-3 meals per day   161 grams per day entered on average   Drinks: diet sodas   Water with SF packets        CURRENT EXERCISE:  No formal       Review of Systems  Review of Systems   Constitutional:  Negative for appetite change and fatigue.   HENT:  Negative for trouble swallowing.    Eyes:  Negative for visual disturbance.   Respiratory:  Negative for shortness of breath.    Cardiovascular:  Negative for chest pain.   Gastrointestinal:  Negative for nausea.   Endocrine: Negative for polydipsia, polyphagia and polyuria.   Genitourinary:         No Nocturia    Skin:  Negative for wound.   Neurological:  Negative for numbness.       Physical Exam   Physical Exam  Vitals and nursing note reviewed.   Constitutional:       Appearance: He is well-developed. He is obese.   HENT:      Head: Normocephalic and atraumatic.      Right Ear: External ear normal.      Left Ear: External ear normal.      Nose: Nose normal.   Neck:      Thyroid: No thyromegaly.      Trachea: No tracheal deviation.   Cardiovascular:      Rate and Rhythm: Normal rate and regular rhythm.      Heart sounds: No murmur heard.  Pulmonary:       Effort: Pulmonary effort is normal. No respiratory distress.      Breath sounds: Normal breath sounds.   Abdominal:      Palpations: Abdomen is soft.      Tenderness: There is no abdominal tenderness.      Hernia: No hernia is present.   Musculoskeletal:      Cervical back: Normal range of motion and neck supple.   Skin:     General: Skin is warm and dry.      Capillary Refill: Capillary refill takes less than 2 seconds.      Findings: No rash.      Comments: Insulin pump sites and Dexcom sites are normal appearing. No lipo hypertropthy or atrophy     Neurological:      Mental Status: He is alert and oriented to person, place, and time.      Cranial Nerves: No cranial nerve deficit.   Psychiatric:         Behavior: Behavior normal.         Judgment: Judgment normal.           FOOT EXAMINATION: Appropriate footwear           Lab Results   Component Value Date    TSH 2.04 09/13/2023           Type 1 diabetes mellitus with hyperglycemia  A1c level is pending  Bg readings are improving on omnipod 5 download   He has been in automated mode 100% of the time---much better than our last visit  Reviewed pump optimization with patient  Reviewed pump troubleshooting  Discussed hitting the use CGM button AGAIN today ---we discussed how this helps to prevent insulin stacking  Discussed transitioning to set doses instead of carbohydrate counting---he deferred and would like to work on carbohydrate counting      Reviewed carbohydrate counting----discussed apps that he can download on his phone such as my fitness pal or [employer] to help with carbohydrate counting  Discussed that when the Omnipod 5 yvrose does come out with the apple phone---it should have carbohydrate library with in the Omnipod 5 yvrose  We also discussed that he can upgrade to the Dexcom G7 when his Omnipod 5 pods are compatible---he will just need to let me know      He needs to request a new Omnipod 5 PDM from [employer]---I gave dad the number to call      --  Medication Changes:  Continue Omnipod 5.  Remain in auto mode as much as possible    Pump settings:  Basal:    MN-6AM: 2.5 units/hr   MN-Noon: 2 units/hr   Noon-MN: 2.35 units/hr       ICR:    MN-6AM: 1: 5 grams   6Am-Noon: 1: 5 grams   Noon--4 PM- 1:4 grams  4PM- MN: 1: 4 grams       ISF  MN-6AM: 1: 20  6Am-MN: 1: 15-- cut back       Target:  110    IOB: 3 hours    -- Reviewed goals of therapy are to get the best control we can without hypoglycemia.  -- Reviewed patient's current insulin regimen. Clarified proper insulin dose and timing in relation to meals, etc. Insulin injection sites and proper rotation instructed.   -- Advised frequent self blood glucose monitoring.  Patient encouraged to document glucose results and bring them to every clinic visit. - continue to use Dexcom -- supplies come from pharmacy now-- RX sent to [facility] ---for now he will continue the Dexcom G6---he may consider upgrading to the Dexcom G7 in the future when his Omnipod is compatible  -- Hypoglycemia precautions discussed. Instructed on precautions before driving.    -- Call for Bg repeatedly < 70 or > 200.   -- Close adherence to lifestyle changes recommended.   -- Periodic follow ups for eye evaluations, foot care and dental care suggested.      Patient requires a therapeutic CGM and is willing to use therapeutic CGM/SMBG for Necessary frequent adjustments in insulin therapy. Patient is using SMBG for glucose monitoring (4 + times a day).  Patient is having Hypoglycemia and Hypoglycemia Unawareness of <50mg/dl.   I have assessed adherence to CGM regimen and to the plan.        Insulin pump in place  See above     Insulin pump fitting or adjustment  See above     Class 2 severe obesity due to excess calories with serious comorbidity and body mass index (BMI) of 35.0 to 35.9 in adult  Body mass index is 35.16 kg/m².  Increases insulin resistance.   Discussed DM diet and exercise.       Insulin resistance  TDD of  100 units   May  benefit from Metformin- can consider in the future     Autism spectrum disorder  May hinder overall diabetes management  Has strong family support           Pump backup plan    If the insulin pump is non functional and discontinued for anticipated more than 20 hours, please give daily injections of:   Long acting insulin Levemir  28 units BID   Short acting insulin Novolog 1:5-1:6 for meals according to carb ratios and sensitivity factor in the pump.     When the insulin pump is restarted, do not restart basal rates until at least 22 hours after the last long acting insulin injection. You can set a 0% temporary basal setting that will last until this time and use your pump to bolus for meals and correction.     For any technical insulin pump issues, please contact the insulin pump company; the toll free number is printed on the label on the back of the insulin pump.       If your sugar is running high for a few hours and does not respond to two correction doses from the insulin pump, it may mean that you have a bad pump site and the site should be changed         I spent a total of 30 minutes on the day of the visit.This includes face to face time and non-face to face time preparing to see the patient (eg, review of tests), obtaining and/or reviewing separately obtained history, documenting clinical information in the electronic or other health record, independently interpreting results and communicating results to the patient/family/caregiver, or care coordinator.            Follow up in about 6 months (around 2/19/2025).   Follow up with me in 6 months with labs at [facility] prior           No orders of the defined types were placed in this encounter.      Recommendations were discussed with the patient in detail  The patient verbalized understanding and agrees with the plan outlined as above.     This note was partly generated with [employer] voice recognition software. I apologize for any possible typographical  errors.

## 2024-08-19 NOTE — ASSESSMENT & PLAN NOTE
A1c level is pending  Bg readings are improving on omnipod 5 download   He has been in automated mode 100% of the time---much better than our last visit  Reviewed pump optimization with patient  Reviewed pump troubleshooting  Discussed hitting the use CGM button AGAIN today ---we discussed how this helps to prevent insulin stacking  Discussed transitioning to set doses instead of carbohydrate counting---he deferred and would like to work on carbohydrate counting      Reviewed carbohydrate counting----discussed apps that he can download on his phone such as my fitness pal or [employer] to help with carbohydrate counting  Discussed that when the Omnipod 5 yvrose does come out with the apple phone---it should have carbohydrate library with in the Omnipod 5 yvrose  We also discussed that he can upgrade to the Dexcom G7 when his Omnipod 5 pods are compatible---he will just need to let me know      He needs to request a new Omnipod 5 PDM from [employer]---I gave dad the number to call      -- Medication Changes:  Continue Omnipod 5.  Remain in auto mode as much as possible    Pump settings:  Basal:    MN-6AM: 2.5 units/hr   MN-Noon: 2 units/hr   Noon-MN: 2.35 units/hr       ICR:    MN-6AM: 1: 5 grams   6Am-Noon: 1: 5 grams   Noon--4 PM- 1:4 grams  4PM- MN: 1: 4 grams       ISF  MN-6AM: 1: 20  6Am-MN: 1: 15-- cut back       Target:  110    IOB: 3 hours    -- Reviewed goals of therapy are to get the best control we can without hypoglycemia.  -- Reviewed patient's current insulin regimen. Clarified proper insulin dose and timing in relation to meals, etc. Insulin injection sites and proper rotation instructed.   -- Advised frequent self blood glucose monitoring.  Patient encouraged to document glucose results and bring them to every clinic visit. - continue to use Dexcom -- supplies come from pharmacy now-- [facility] ---for now he will continue the Dexcom G6---he may consider upgrading to the Dexcom G7 in the future when  his Omnipod is compatible  -- Hypoglycemia precautions discussed. Instructed on precautions before driving.    -- Call for Bg repeatedly < 70 or > 200.   -- Close adherence to lifestyle changes recommended.   -- Periodic follow ups for eye evaluations, foot care and dental care suggested.      Patient requires a therapeutic CGM and is willing to use therapeutic CGM/SMBG for Necessary frequent adjustments in insulin therapy. Patient is using SMBG for glucose monitoring (4 + times a day).  Patient is having Hypoglycemia and Hypoglycemia Unawareness of <50mg/dl.   I have assessed adherence to CGM regimen and to the plan.

## 2024-08-20 DIAGNOSIS — E10.65 TYPE 1 DIABETES MELLITUS WITH HYPERGLYCEMIA: Primary | ICD-10-CM

## 2024-08-28 ENCOUNTER — PATIENT MESSAGE (OUTPATIENT)
Dept: DIABETES | Facility: CLINIC | Age: 29
End: 2024-08-28
Payer: MEDICAID

## 2024-09-03 ENCOUNTER — PATIENT OUTREACH (OUTPATIENT)
Dept: ADMINISTRATIVE | Facility: HOSPITAL | Age: 29
End: 2024-09-03
Payer: MEDICAID

## 2024-09-03 NOTE — PROGRESS NOTES
Health Maintenance Due   Topic Date Due    Pneumococcal Vaccines (Age 0-64) (2 of 2 - PCV) 06/15/2022    Eye Exam  04/08/2023    TETANUS VACCINE  11/15/2023    Influenza Vaccine (1) 09/01/2024    COVID-19 Vaccine (5 - 2023-24 season) 09/01/2024    Foot Exam  09/15/2024        Chart review done.   HM updated.   Immunizations reviewed & updated.   Care Everywhere updated.

## 2024-09-14 DIAGNOSIS — E10.65 TYPE 1 DIABETES MELLITUS WITH HYPERGLYCEMIA: ICD-10-CM

## 2024-09-16 RX ORDER — INSULIN PMP CART,AUT,G6/7,CNTR
1 EACH SUBCUTANEOUS DAILY
Qty: 30 EACH | Refills: 11 | Status: SHIPPED | OUTPATIENT
Start: 2024-09-16

## 2024-09-17 ENCOUNTER — PATIENT OUTREACH (OUTPATIENT)
Dept: ADMINISTRATIVE | Facility: HOSPITAL | Age: 29
End: 2024-09-17
Payer: MEDICAID

## 2024-09-17 ENCOUNTER — OFFICE VISIT (OUTPATIENT)
Dept: PRIMARY CARE CLINIC | Facility: CLINIC | Age: 29
End: 2024-09-17
Payer: MEDICAID

## 2024-09-17 VITALS
TEMPERATURE: 97 F | WEIGHT: 249.25 LBS | HEART RATE: 95 BPM | HEIGHT: 71 IN | DIASTOLIC BLOOD PRESSURE: 86 MMHG | BODY MASS INDEX: 34.9 KG/M2 | SYSTOLIC BLOOD PRESSURE: 124 MMHG | RESPIRATION RATE: 20 BRPM | OXYGEN SATURATION: 98 %

## 2024-09-17 DIAGNOSIS — L03.011 PARONYCHIA OF FINGER, RIGHT: ICD-10-CM

## 2024-09-17 DIAGNOSIS — Z23 NEED FOR VACCINATION: ICD-10-CM

## 2024-09-17 DIAGNOSIS — Z00.00 ANNUAL PHYSICAL EXAM: Primary | ICD-10-CM

## 2024-09-17 DIAGNOSIS — L60.0 INGROWN NAIL OF RIGHT RING FINGER: ICD-10-CM

## 2024-09-17 DIAGNOSIS — E10.65 TYPE 1 DIABETES MELLITUS WITH HYPERGLYCEMIA: Primary | ICD-10-CM

## 2024-09-17 PROCEDURE — 3066F NEPHROPATHY DOC TX: CPT | Mod: CPTII,,, | Performed by: FAMILY MEDICINE

## 2024-09-17 PROCEDURE — 3051F HG A1C>EQUAL 7.0%<8.0%: CPT | Mod: CPTII,,, | Performed by: FAMILY MEDICINE

## 2024-09-17 PROCEDURE — 90656 IIV3 VACC NO PRSV 0.5 ML IM: CPT | Mod: PBBFAC,PN

## 2024-09-17 PROCEDURE — 90715 TDAP VACCINE 7 YRS/> IM: CPT | Mod: PBBFAC,PN

## 2024-09-17 PROCEDURE — 90677 PCV20 VACCINE IM: CPT | Mod: PBBFAC,PN

## 2024-09-17 PROCEDURE — 99999 PR PBB SHADOW E&M-EST. PATIENT-LVL IV: CPT | Mod: PBBFAC,,, | Performed by: FAMILY MEDICINE

## 2024-09-17 PROCEDURE — 99395 PREV VISIT EST AGE 18-39: CPT | Mod: S$PBB,,, | Performed by: FAMILY MEDICINE

## 2024-09-17 PROCEDURE — 3061F NEG MICROALBUMINURIA REV: CPT | Mod: CPTII,,, | Performed by: FAMILY MEDICINE

## 2024-09-17 PROCEDURE — 99999PBSHW PR PBB SHADOW TECHNICAL ONLY FILED TO HB: Mod: PBBFAC,,,

## 2024-09-17 PROCEDURE — 1159F MED LIST DOCD IN RCRD: CPT | Mod: CPTII,,, | Performed by: FAMILY MEDICINE

## 2024-09-17 PROCEDURE — 90471 IMMUNIZATION ADMIN: CPT | Mod: PBBFAC,PN

## 2024-09-17 PROCEDURE — 90472 IMMUNIZATION ADMIN EACH ADD: CPT | Mod: PBBFAC,PN

## 2024-09-17 PROCEDURE — 3008F BODY MASS INDEX DOCD: CPT | Mod: CPTII,,, | Performed by: FAMILY MEDICINE

## 2024-09-17 PROCEDURE — 1160F RVW MEDS BY RX/DR IN RCRD: CPT | Mod: CPTII,,, | Performed by: FAMILY MEDICINE

## 2024-09-17 PROCEDURE — 3079F DIAST BP 80-89 MM HG: CPT | Mod: CPTII,,, | Performed by: FAMILY MEDICINE

## 2024-09-17 PROCEDURE — 99214 OFFICE O/P EST MOD 30 MIN: CPT | Mod: PBBFAC,PN | Performed by: FAMILY MEDICINE

## 2024-09-17 PROCEDURE — 3074F SYST BP LT 130 MM HG: CPT | Mod: CPTII,,, | Performed by: FAMILY MEDICINE

## 2024-09-17 RX ORDER — MUPIROCIN 20 MG/G
OINTMENT TOPICAL 3 TIMES DAILY
Qty: 22 G | Refills: 1 | Status: SHIPPED | OUTPATIENT
Start: 2024-09-17

## 2024-09-17 RX ORDER — SULFAMETHOXAZOLE AND TRIMETHOPRIM 800; 160 MG/1; MG/1
1 TABLET ORAL 2 TIMES DAILY
Qty: 20 TABLET | Refills: 0 | Status: SHIPPED | OUTPATIENT
Start: 2024-09-17 | End: 2024-09-27

## 2024-09-17 RX ADMIN — TETANUS TOXOID, REDUCED DIPHTHERIA TOXOID AND ACELLULAR PERTUSSIS VACCINE, ADSORBED 0.5 ML: 5; 2.5; 8; 8; 2.5 SUSPENSION INTRAMUSCULAR at 04:09

## 2024-09-17 RX ADMIN — PNEUMOCOCCAL 20-VALENT CONJUGATE VACCINE 0.5 ML
2.2; 2.2; 2.2; 2.2; 2.2; 2.2; 2.2; 2.2; 2.2; 2.2; 2.2; 2.2; 2.2; 2.2; 2.2; 2.2; 4.4; 2.2; 2.2; 2.2 INJECTION, SUSPENSION INTRAMUSCULAR at 04:09

## 2024-09-17 RX ADMIN — INFLUENZA VIRUS VACCINE 0.5 ML: 15; 15; 15 SUSPENSION INTRAMUSCULAR at 04:09

## 2024-09-17 NOTE — PROGRESS NOTES
Verified pt ID using name and . Lauren Reyes, LPN. Administered TDAP and Influenza in right deltoid per physician order using aseptic technique. Aspirated and no blood return noted. Pt tolerated well with no adverse reactions noted.

## 2024-09-17 NOTE — LETTER
AUTHORIZATION FOR RELEASE OF   CONFIDENTIAL INFORMATION    Dear Dr. Mock,    We are seeing Jonathon Daniel, date of birth 1995, in the clinic at [facility]. Garo Schumacher MD is the patient's PCP. Jonathon Daniel has an outstanding lab/procedure at the time we reviewed his chart. In order to help keep his health information updated, he has authorized us to request the following medical record(s):        (  )  MAMMOGRAM                                      (  )  COLONOSCOPY      (  )  PAP SMEAR                                          (  )  OUTSIDE LAB RESULTS     (  )  DEXA SCAN                                          (X)  EYE EXAM            (  )  FOOT EXAM                                          (  )  ENTIRE RECORD     (  )  OUTSIDE IMMUNIZATIONS                 (  )  _______________       ATTN: MAY      Please fax records to Garo Schumacher MD, [phone]     If you have any questions, please contact May at [phone].           Patient Name: Jonathon Daniel  : 1995  Patient Phone #: [phone]

## 2024-09-17 NOTE — PROGRESS NOTES
Verified pt by name and . NKDA. Per physician orders pt was administered Prevnar 20 IM to left deltoid using aseptic technique. Pt tolerated well. No adverse effects or pain reported. MD notified.

## 2024-09-17 NOTE — PROGRESS NOTES
"Subjective:       Patient ID: Jonathon Daniel is a 28 y.o. male.    Chief Complaint: Annual Exam    Jonathon Daniel is a 28 y.o. male seen today for a routine checkup.  No recent illness or injury.  Compliant with all prescribed medications without adverse side effects.  Continues to have issues with painful ingrowing fingernail on right 4th finger      Review of Systems   Constitutional:  Negative for activity change and unexpected weight change.   HENT:  Negative for hearing loss, rhinorrhea and trouble swallowing.    Eyes:  Negative for discharge and visual disturbance.   Respiratory:  Negative for chest tightness and wheezing.    Cardiovascular:  Negative for chest pain and palpitations.   Gastrointestinal:  Negative for blood in stool, constipation, diarrhea and vomiting.   Endocrine: Negative for polydipsia and polyuria.   Genitourinary:  Negative for difficulty urinating, hematuria and urgency.   Musculoskeletal:  Negative for arthralgias, joint swelling and neck pain.   Skin:  Positive for color change.   Neurological:  Negative for weakness and headaches.   Psychiatric/Behavioral:  Negative for confusion and dysphoric mood.        Objective:      Vitals:    09/17/24 1533   BP: 124/86   BP Location: Right arm   Patient Position: Sitting   BP Method: Medium (Manual)   Pulse: 95   Resp: 20   Temp: 97.4 °F (36.3 °C)   TempSrc: Temporal   SpO2: 98%   Weight: 113 kg (249 lb 3.7 oz)   Height: 5' 11" (1.803 m)     BP Readings from Last 5 Encounters:   09/17/24 124/86   08/19/24 129/83   05/06/24 130/88   04/17/24 (!) 159/98   04/17/24 (!) 150/90     Wt Readings from Last 5 Encounters:   09/17/24 113 kg (249 lb 3.7 oz)   08/19/24 114.4 kg (252 lb 1.6 oz)   05/06/24 114.4 kg (252 lb 3.3 oz)   04/17/24 115.3 kg (254 lb 3.1 oz)   04/17/24 115.7 kg (255 lb)     Physical Exam  Vitals and nursing note reviewed.   Constitutional:       General: He is not in acute distress.     Appearance: Normal appearance. He is well-developed. "   HENT:      Head: Normocephalic and atraumatic.   Cardiovascular:      Rate and Rhythm: Normal rate and regular rhythm.      Heart sounds: Normal heart sounds.   Pulmonary:      Effort: Pulmonary effort is normal.      Breath sounds: Normal breath sounds.   Musculoskeletal:        Arms:       Right lower leg: No edema.      Left lower leg: No edema.   Skin:     General: Skin is warm and dry.   Neurological:      Mental Status: He is alert and oriented to person, place, and time.   Psychiatric:         Mood and Affect: Mood normal.         Behavior: Behavior normal.         Lab Results   Component Value Date    WBC 6.15 01/27/2024    HGB 16.2 01/27/2024    HCT 47.1 01/27/2024     01/27/2024    CHOL 174 08/19/2024    CHOL 174 08/19/2024    TRIG 134 08/19/2024    TRIG 134 08/19/2024    HDL 34 (L) 08/19/2024    HDL 34 (L) 08/19/2024    ALT 13 08/19/2024    AST 12 08/19/2024     08/19/2024    K 4.1 08/19/2024     08/19/2024    CREATININE 1.08 08/19/2024    BUN 17 08/19/2024    CO2 26 08/19/2024    TSH 1.65 08/19/2024    HGBA1C 7.1 (H) 08/19/2024    MICROALBUR 1.1 08/19/2024    MICROALBUR 1.1 08/19/2024      Assessment:       1. Annual physical exam    2. Ingrown nail of right ring finger    3. Need for vaccination    4. Paronychia of finger, right        Plan:       Annual physical exam    Ingrown nail of right ring finger  Advised to try notching his fingernail in a V.  Follow-up with Derm if no improvement  Need for vaccination  -     pneumoc 20-penelope conj-dip cr(PF) (PREVNAR-20 (PF)) injection Syrg 0.5 mL  -     influenza (Flulaval, Fluzone, Fluarix) 45 mcg/0.5 mL IM vaccine (> or = 6 mo) 0.5 mL  -     DIPH,PERTUSS(ACEL),TET VAC(PF)(ADULT)(ADACEL)(TDaP)    Paronychia of finger, right  -     sulfamethoxazole-trimethoprim 800-160mg (BACTRIM DS) 800-160 mg Tab; Take 1 tablet by mouth 2 (two) times daily. for 10 days  Dispense: 20 tablet; Refill: 0  -     mupirocin (BACTROBAN) 2 % ointment; Apply  "topically 3 (three) times daily.  Dispense: 22 g; Refill: 1      Medication List with Changes/Refills   New Medications    MUPIROCIN (BACTROBAN) 2 % OINTMENT    Apply topically 3 (three) times daily.    SULFAMETHOXAZOLE-TRIMETHOPRIM 800-160MG (BACTRIM DS) 800-160 MG TAB    Take 1 tablet by mouth 2 (two) times daily. for 10 days   Current Medications    BLOOD-GLUCOSE SENSOR (DEXCOM G6 SENSOR) CHRISTOPHER    1 Device by Misc.(Non-Drug; Combo Route) route every 10 days.    BLOOD-GLUCOSE TRANSMITTER (DEXCOM G6 TRANSMITTER) CHRISTOPHER    1 Device by Misc.(Non-Drug; Combo Route) route every 3 (three) months.    CETIRIZINE (ZYRTEC) 10 MG TABLET    Take 10 mg by mouth once daily.    DOCUSATE SODIUM (STOOL SOFTENER ORAL)    Take by mouth once daily.    GLUCAGON (BAQSIMI) 3 MG/ACTUATION SPRY    3 mg (one actuation) into a single nostril; if no response, may repeat in 15 minutes using a new intranasal device.    INSULIN ASPART U-100 (NOVOLOG FLEXPEN U-100 INSULIN) 100 UNIT/ML (3 ML) INPN PEN    To have on file in case pump failure. Insulin to carbohydrate ratio 1:5 TID AC + correction scale. MAX TDD of 50 units    INSULIN ASPART, NIACINAMIDE, (FIASP U-100 INSULIN) 100 UNIT/ML SOLN    TO USE CONTINUOUSLY WITH HIS INSULIN PUMP. MAX TOTAL DAILY DOSE  UNITS    INSULIN DETEMIR U-100, LEVEMIR, (LEVEMIR FLEXTOUCH U100 INSULIN) 100 UNIT/ML (3 ML) INPN PEN    Inject 48 units daily. To have on file in case of insulin pump failure.    INSULIN PUMP CART,AUTOMATED,BT (OMNIPOD 5 G6 PODS, GEN 5,) CRTG    INJECT 1 DEVICE INTO THE SKIN ONCE DAILY    PEN NEEDLE, DIABETIC (BD ULTRA-FINE ELLEN PEN NEEDLE) 32 GAUGE X 5/32" NDLE    To use with back up Levemir and Novolog injections if needed.           "

## 2024-09-17 NOTE — PROGRESS NOTES
Health Maintenance Due   Topic Date Due    Pneumococcal Vaccines (Age 0-64) (2 of 2 - PCV) 06/15/2022    Eye Exam  04/08/2023    TETANUS VACCINE  11/15/2023    Influenza Vaccine (1) 09/01/2024    COVID-19 Vaccine (5 - 2023-24 season) 09/01/2024    Foot Exam  09/15/2024     Immunizations - reviewed and updated   Care Everywhere - triggered   Care Teams -   Outreach - Pre visit chart review done. Patient has an appointment with PCP today, 9/17/2024  Diabetic eye exam due, order placed. RACHEL sent to Dr. Mock for most recent eye records

## 2024-09-19 ENCOUNTER — PATIENT MESSAGE (OUTPATIENT)
Dept: PRIMARY CARE CLINIC | Facility: CLINIC | Age: 29
End: 2024-09-19
Payer: MEDICAID

## 2024-09-25 ENCOUNTER — PATIENT MESSAGE (OUTPATIENT)
Dept: DIABETES | Facility: CLINIC | Age: 29
End: 2024-09-25
Payer: MEDICAID

## 2024-10-15 ENCOUNTER — PATIENT MESSAGE (OUTPATIENT)
Dept: DIABETES | Facility: CLINIC | Age: 29
End: 2024-10-15
Payer: MEDICAID

## 2024-10-28 ENCOUNTER — PATIENT MESSAGE (OUTPATIENT)
Dept: PRIMARY CARE CLINIC | Facility: CLINIC | Age: 29
End: 2024-10-28
Payer: MEDICAID

## 2024-11-05 ENCOUNTER — PATIENT OUTREACH (OUTPATIENT)
Dept: ADMINISTRATIVE | Facility: HOSPITAL | Age: 29
End: 2024-11-05
Payer: MEDICAID

## 2024-11-05 NOTE — LETTER
AUTHORIZATION FOR RELEASE OF   CONFIDENTIAL INFORMATION    Dear Dr. Mock,    We are seeing Jonathon Daniel, date of birth 1995, in the clinic at [facility]. Garo Schumacher MD is the patient's PCP. Jonathon Daniel has an outstanding lab/procedure at the time we reviewed his chart. In order to help keep his health information updated, he has authorized us to request the following medical record(s):        (  )  MAMMOGRAM                                      (  )  COLONOSCOPY      (  )  PAP SMEAR                                          (  )  OUTSIDE LAB RESULTS     (  )  DEXA SCAN                                          ( x )  EYE EXAM    Diabetic Retinopathy          (  )  FOOT EXAM                                          (  )  ENTIRE RECORD     (  )  OUTSIDE IMMUNIZATIONS                 (  )  _______________         Please fax records to Garo Schumacher MD, [phone]       If you have any questions, please contact the clinic at [phone].           Patient Name: Jonathon Daniel  : 1995  Patient Phone #: [phone]

## 2024-11-13 ENCOUNTER — PATIENT MESSAGE (OUTPATIENT)
Dept: DIABETES | Facility: CLINIC | Age: 29
End: 2024-11-13
Payer: MEDICAID

## 2024-11-13 ENCOUNTER — PATIENT MESSAGE (OUTPATIENT)
Dept: RESEARCH | Facility: HOSPITAL | Age: 29
End: 2024-11-13
Payer: MEDICAID

## 2024-12-12 DIAGNOSIS — E10.65 TYPE 1 DIABETES MELLITUS WITH HYPERGLYCEMIA: ICD-10-CM

## 2024-12-12 RX ORDER — BLOOD-GLUCOSE TRANSMITTER
EACH MISCELLANEOUS
Qty: 1 EACH | Refills: 4 | Status: SHIPPED | OUTPATIENT
Start: 2024-12-12

## 2024-12-17 ENCOUNTER — CLINICAL SUPPORT (OUTPATIENT)
Dept: PRIMARY CARE CLINIC | Facility: CLINIC | Age: 29
End: 2024-12-17
Attending: FAMILY MEDICINE
Payer: MEDICAID

## 2024-12-17 DIAGNOSIS — E10.65 TYPE 1 DIABETES MELLITUS WITH HYPERGLYCEMIA: ICD-10-CM

## 2024-12-17 PROCEDURE — 92228 IMG RTA DETC/MNTR DS PHY/QHP: CPT | Mod: 26,S$PBB,, | Performed by: OPTOMETRIST

## 2024-12-17 PROCEDURE — 2025F 7 FLD RTA PHOTO W/O RTNOPTHY: CPT | Mod: CPTII,,, | Performed by: OPTOMETRIST

## 2024-12-17 PROCEDURE — 92228 IMG RTA DETC/MNTR DS PHY/QHP: CPT | Mod: PBBFAC,PN

## 2024-12-17 NOTE — PROGRESS NOTES
Jonathon Daniel is a 29 y.o. male here for a diabetic eye screening with non-dilated fundus photos per Dr. Schumacher.    Patient cooperative?: Yes  Small pupils?: No  Last eye exam: > 1 year    For exam results, see Encounter Report.

## 2025-01-27 ENCOUNTER — PATIENT MESSAGE (OUTPATIENT)
Dept: DIABETES | Facility: CLINIC | Age: 30
End: 2025-01-27
Payer: MEDICAID

## 2025-01-27 ENCOUNTER — TELEPHONE (OUTPATIENT)
Dept: DIABETES | Facility: CLINIC | Age: 30
End: 2025-01-27
Payer: MEDICAID

## 2025-01-27 DIAGNOSIS — E10.65 TYPE 1 DIABETES MELLITUS WITH HYPERGLYCEMIA: ICD-10-CM

## 2025-01-27 RX ORDER — INSULIN ASPART 100 [IU]/ML
INJECTION, SOLUTION INTRAVENOUS; SUBCUTANEOUS
Qty: 60 ML | Refills: 11 | Status: SHIPPED | OUTPATIENT
Start: 2025-01-27

## 2025-01-27 RX ORDER — INSULIN ASPART INJECTION 100 [IU]/ML
INJECTION, SOLUTION SUBCUTANEOUS
Qty: 60 ML | Refills: 11 | Status: SHIPPED | OUTPATIENT
Start: 2025-01-27

## 2025-02-10 ENCOUNTER — PATIENT MESSAGE (OUTPATIENT)
Dept: DIABETES | Facility: CLINIC | Age: 30
End: 2025-02-10
Payer: MEDICAID

## 2025-02-18 ENCOUNTER — RESULTS FOLLOW-UP (OUTPATIENT)
Dept: DIABETES | Facility: CLINIC | Age: 30
End: 2025-02-18

## 2025-02-18 ENCOUNTER — TELEPHONE (OUTPATIENT)
Dept: DIABETES | Facility: CLINIC | Age: 30
End: 2025-02-18
Payer: MEDICAID

## 2025-02-18 NOTE — PROGRESS NOTES
CC:   Chief Complaint   Patient presents with    Diabetes Mellitus       HPI: Jonathon Daniel is a 29 y.o. male presents for a follow up visit today for the management of T1DM.      He  was diagnosed with Type 1 diabetes in 2009 (14 years old)when he went into DKA with BG reading of 1108. Started on insulin therapy at the time of diagnosis.   He started with the Omnipod within a year of diagnosis of T1DM. He started the Medtronic 670G in 2018. He has also worn the Friendship pump and Medtronic 670G pump in the past.   He previously wore a Dexcom personal CGM- G4 and he really liked it.   He does not care for the Medtronic 670G pump or the Guardian sensor.  Reports that he was never able to get the guardian sensor to work right and has never been able to be in auto mode on the 670G.  He is now on the Dexcom G6.  He started the Tandem pump in June 2021.   He is now off the tandem insulin pump in on the Omnipod 5  He started the Omnipod 5 on November 17, 2023    Family hx of diabetes: uncle- T1DM- JOSE ROBERTO   Hospitalized for diabetes: a couple of visits to the ER for hyperglycemia   DKA at diagnosis   In 2020- DKA - pump malfunction     No personal or FH of thyroid cancer or personal of pancreatic cancer or pancreatitis.     -started the Omnipod 5 on November 17, 2023  He is liking the Omnipod 5 in Dexcom integration.          Our last visit was in August of 2024  At that visit we cut back on his sensitivity factor from 6:00 a.m. to midnight  Continued all other pump settings  Continue to use the Dexcom  Recent A1c of 7%--down from 7.1%  See attached downloads  -he feels that he needs a tighter carbohydrate ratio in the morning time--feels that the 1:5-insulin to carbohydrate ratio was not tight enough  He is having issues with his Omnipod 5 / PDM ---he is interested in getting the Omnipod 5 on the phone---he has an Apple iPhone  He is okay with continuing with the Dexcom G6                          DIABETES  COMPLICATIONS: none      Diabetes Management Status    ASA:  No    Statin: Not taking  ACE/ARB: Not taking    Screening or Prevention Patient's value Goal Complete/Controlled?   HgA1C Testing and Control   Lab Results   Component Value Date    HGBA1C 7.0 (H) 02/17/2025      Annually/Less than 8% No   Lipid profile : 08/19/2024 Annually No   LDL control Lab Results   Component Value Date    LDLCALC 115 (H) 08/19/2024    LDLCALC 115 (H) 08/19/2024    Annually/Less than 100 mg/dl  No   Nephropathy screening Lab Results   Component Value Date    LABMICR 11.0 09/24/2018     Lab Results   Component Value Date    PROTEINUA Negative 04/13/2021    Annually No   Blood pressure BP Readings from Last 1 Encounters:   02/19/25 126/86    Less than 140/90 Yes   Dilated retinal exam : 12/17/2024 - external- [place] --  Annually Yes   Foot exam   : 02/19/2025 Annually Yes       CURRENT A1C:    Hemoglobin A1C   Date Value Ref Range Status   02/17/2025 7.0 (H) <5.7 % of total Hgb Final     Comment:     For someone without known diabetes, a hemoglobin A1c  value of 6.5% or greater indicates that they may have   diabetes and this should be confirmed with a follow-up   test.     For someone with known diabetes, a value <7% indicates   that their diabetes is well controlled and a value   greater than or equal to 7% indicates suboptimal   control. A1c targets should be individualized based on   duration of diabetes, age, comorbid conditions, and   other considerations.     Currently, no consensus exists regarding use of  hemoglobin A1c for diagnosis of diabetes for children.         08/19/2024 7.1 (H) <5.7 % of total Hgb Final     Comment:     For someone without known diabetes, a hemoglobin A1c  value of 6.5% or greater indicates that they may have   diabetes and this should be confirmed with a follow-up   test.     For someone with known diabetes, a value <7% indicates   that their diabetes is well controlled and a value   greater than  or equal to 7% indicates suboptimal   control. A1c targets should be individualized based on   duration of diabetes, age, comorbid conditions, and   other considerations.     Currently, no consensus exists regarding use of  hemoglobin A1c for diagnosis of diabetes for children.         This test was performed on the Roche dion c503 platform.  Effective 11/13/23, a change in test platforms from the  Abbott  to the Roche dion c503 may have shifted  HbA1c results compared to historical results.  Based on laboratory validation testing conducted at  [facility], the Roche platform relative to the Abbott  platform had an average increase in HbA1c value of  < or = 0.3%. This difference is within accepted   variability established by the National Glycohemoglobin  Standardization Program. Note that not all individuals  will have had a shift in their results and direct  comparisons between historical and current results for  testing conducted on different platforms is not  recommended.         12/14/2023 7.5 (H) <5.7 % of total Hgb Final     Comment:     For someone without known diabetes, a hemoglobin A1c  value of 6.5% or greater indicates that they may have   diabetes and this should be confirmed with a follow-up   test.     For someone with known diabetes, a value <7% indicates   that their diabetes is well controlled and a value   greater than or equal to 7% indicates suboptimal   control. A1c targets should be individualized based on   duration of diabetes, age, comorbid conditions, and   other considerations.     Currently, no consensus exists regarding use of  hemoglobin A1c for diagnosis of diabetes for children.             GOAL A1C: 6.5% without hypoglycemia         DM MEDICATIONS USED IN THE PAST: Medtronic 670G pump   Omnipod   Bradley   Dexcom G4   Levemir   Lantus   Novolog   Humalog   Dexcom G6   Tandem pump   Omnipod 5   Fiasp         CURRENT DIABETES MEDICATIONS:   Insulin Pump: Omnipod 5 with  Fiasp  Pump settings:  Basal:    MN-6AM: 2.5 units/hr   6AM- MN: 2 units/hr           ICR:    MN-6AM: 1: 5 grams   6Am-Noon: 1: 5 grams   Noon--4 PM- 1:4 grams  4PM- MN: 1: 4 grams         ISF  MN-6AM: 1: 20  6Am-MN: 1: 15-- cut back         Target:  110     IOB: 3 hours      Temp basals: none     Pump site change: q 1-2 days  Insulin TDD:113.7  units   Basal 47%   Bolus 53%  CHO intake:232.9 grams of carbohydrates   Using the bolus wizard: yes   Overridin%   Automated mode 100%   2% automated     Back up Lantus/Levemir:  Back up Rx at pharmacy-- Levemir - need to change to Lantus     Patient's pump was downloaded in clinic today and reviewed with patient.   Please see attached documents for pump download.     Pump supplies from pharmacy         BLOOD GLUCOSE MONITORING:    Sensor type: Dexcom G6   Average BG readin  Time in range: 75%  22% high, 3% very high, 0% low, 0% very low  Site change:  q10 days    Sensor was downloaded in clinic today and reviewed with patient.   Please see attached document for download.             Supplies from Pharmacy       HYPOGLYCEMIA: 0% low and 0% very low seen on download-- reports miscalculating carbohydrate sometimes  Sometimes due to insulin stacking as he is not hitting the use CGM button   Has gone as low as 50's    May have hypoglycemia unawareness- difficult due to hx of autism and hx of anoxic brain injury.   Glucagon kit: Baqsimi   Medic alert bracelet: yes- bracelet         MEALS: eating 2-3 meals per day   232.9 grams per day entered on average   Drinks: diet sodas   Water with SF packets        CURRENT EXERCISE:  No formal       Review of Systems  Review of Systems   Constitutional:  Negative for appetite change and fatigue.   HENT:  Negative for trouble swallowing.    Eyes:  Negative for visual disturbance.   Respiratory:  Negative for shortness of breath.    Cardiovascular:  Negative for chest pain.   Gastrointestinal:  Negative for nausea.   Endocrine:  Negative for polydipsia, polyphagia and polyuria.   Genitourinary:         No Nocturia    Skin:  Negative for wound.   Neurological:  Negative for numbness.       Physical Exam   Physical Exam  Vitals and nursing note reviewed.   Constitutional:       Appearance: He is well-developed. He is obese.   HENT:      Head: Normocephalic and atraumatic.      Right Ear: External ear normal.      Left Ear: External ear normal.      Nose: Nose normal.   Neck:      Thyroid: No thyromegaly.      Trachea: No tracheal deviation.   Cardiovascular:      Rate and Rhythm: Normal rate and regular rhythm.      Heart sounds: No murmur heard.  Pulmonary:      Effort: Pulmonary effort is normal. No respiratory distress.      Breath sounds: Normal breath sounds.   Abdominal:      Palpations: Abdomen is soft.      Tenderness: There is no abdominal tenderness.      Hernia: No hernia is present.   Musculoskeletal:      Cervical back: Normal range of motion and neck supple.   Skin:     General: Skin is warm and dry.      Capillary Refill: Capillary refill takes less than 2 seconds.      Findings: No rash.      Comments: Insulin pump sites and Dexcom sites are normal appearing. No lipo hypertropthy or atrophy     Neurological:      Mental Status: He is alert and oriented to person, place, and time.      Cranial Nerves: No cranial nerve deficit.   Psychiatric:         Behavior: Behavior normal.         Judgment: Judgment normal.             FOOT EXAMINATION: Appropriate footwear           Lab Results   Component Value Date    TSH 1.65 08/19/2024           Type 1 diabetes mellitus with hyperglycemia  A1c level is trending down---want to get him closer 6.5%  Bg readings are improving on omnipod 5 download   He has been in automated mode 100% of the time  Reviewed pump optimization with patient  Reviewed pump troubleshooting  Discussed hitting the use CGM button AGAIN today ---we discussed how this helps to prevent insulin stacking  He wants to stay  with carbohydrate counting  He feels that he needs a tighter carbohydrate ratio in the morning time    We were able to download the Omnipod 5 yvrose on to his Apple iPhone---so he no longer has to carry the PDM  I personally called Omnipod to get everything set up  Also called Omnipod informed him that the PDM that he is currently using is not holding a charge and have requested a new PDM be sent out for backup if he is not using his phone      Reviewed carbohydrate counting----discussed apps that he can download on his phone such as my fitness pal or [employer] to help with carbohydrate counting      -- Medication Changes:  Continue Omnipod 5.  Remain in auto mode as much as possible    Pump settings:  Basal:    MN-6AM: 2.5 units/hr   6AM-MN: 2 units/hr          ICR:    MN--Noon: 1: 4 grams --tightened  Noon--- MN: 1: 4 grams       ISF  MN-6AM: 1: 20  6Am-MN: 1: 15-      Target:  110    IOB: 3 hours    -- Reviewed goals of therapy are to get the best control we can without hypoglycemia.  -- Reviewed patient's current insulin regimen. Clarified proper insulin dose and timing in relation to meals, etc. Insulin injection sites and proper rotation instructed.   -- Advised frequent self blood glucose monitoring.  Patient encouraged to document glucose results and bring them to every clinic visit. - continue to use Dexcom -- supplies come from pharmacy now-- [facility] ---for now he will continue the Dexcom G6---we just got the Omnipod 5 yvrose on his phone so he will need to continue with the Dexcom G6 as he is using an Apple iPhone  -- Hypoglycemia precautions discussed. Instructed on precautions before driving.    -- Call for Bg repeatedly < 70 or > 200.   -- Close adherence to lifestyle changes recommended.   -- Periodic follow ups for eye evaluations, foot care and dental care suggested.  ---follow up with diabetes education as needed      Patient requires a therapeutic CGM and is willing to use therapeutic  CGM/SMBG for Necessary frequent adjustments in insulin therapy. Patient is using SMBG for glucose monitoring (4 + times a day).  Patient is having Hypoglycemia and Hypoglycemia Unawareness of <50mg/dl.   I have assessed adherence to CGM regimen and to the plan.        Insulin pump in place  See above     Insulin pump fitting or adjustment  See above     Class 2 severe obesity due to excess calories with serious comorbidity and body mass index (BMI) of 36.0 to 36.9 in adult  Body mass index is 36.53 kg/m².  Increases insulin resistance.   Discussed DM diet and exercise.       Insulin resistance  TDD over 100 units per day  May benefit from Metformin- can consider in the future     Autism spectrum disorder  May hinder overall diabetes management  Has strong family support             Pump backup plan    If the insulin pump is non functional and discontinued for anticipated more than 20 hours, please give daily injections of:   Long acting insulin Levemir  28 units BID   Short acting insulin Novolog 1:5-1:6 for meals according to carb ratios and sensitivity factor in the pump.     When the insulin pump is restarted, do not restart basal rates until at least 22 hours after the last long acting insulin injection. You can set a 0% temporary basal setting that will last until this time and use your pump to bolus for meals and correction.     For any technical insulin pump issues, please contact the insulin pump company; the toll free number is printed on the label on the back of the insulin pump.       If your sugar is running high for a few hours and does not respond to two correction doses from the insulin pump, it may mean that you have a bad pump site and the site should be changed           I spent over 40 minutes on the phone with the Omnipod technical support to help patient get the Omnipod 5 yvrose on his phone  The visit was extended due to technical issues and issues with logging into his Omnipod 5 account  I spent  a total of 55 minutes on the day of the visit.This includes face to face time and non-face to face time preparing to see the patient (eg, review of tests), obtaining and/or reviewing separately obtained history, documenting clinical information in the electronic or other health record, independently interpreting results and communicating results to the patient/family/caregiver, or care coordinator.            Follow up in about 6 months (around 8/19/2025).   Follow up with me in 6 months with labs at [facility] prior           Orders Placed This Encounter   Procedures    Hemoglobin A1C     Standing Status:   Future     Number of Occurrences:   1     Expected Date:   8/19/2025     Expiration Date:   8/19/2026    Basic Metabolic Panel     Standing Status:   Future     Number of Occurrences:   1     Expected Date:   8/19/2025     Expiration Date:   8/19/2026    Lipid Panel     Standing Status:   Future     Number of Occurrences:   1     Expected Date:   8/19/2025     Expiration Date:   8/19/2026    Microalbumin/Creatinine Ratio, Urine     Standing Status:   Future     Number of Occurrences:   1     Expected Date:   8/19/2025     Expiration Date:   8/19/2026     Specimen Source:   Urine    TSH     Standing Status:   Future     Number of Occurrences:   1     Expected Date:   8/19/2025     Expiration Date:   8/19/2026       Recommendations were discussed with the patient in detail  The patient verbalized understanding and agrees with the plan outlined as above.     This note was partly generated with [employer] voice recognition software. I apologize for any possible typographical errors.

## 2025-02-19 ENCOUNTER — PATIENT MESSAGE (OUTPATIENT)
Dept: DIABETES | Facility: CLINIC | Age: 30
End: 2025-02-19

## 2025-02-19 ENCOUNTER — OFFICE VISIT (OUTPATIENT)
Dept: DIABETES | Facility: CLINIC | Age: 30
End: 2025-02-19
Payer: MEDICAID

## 2025-02-19 VITALS
OXYGEN SATURATION: 98 % | HEART RATE: 85 BPM | HEIGHT: 71 IN | WEIGHT: 261.88 LBS | BODY MASS INDEX: 36.66 KG/M2 | DIASTOLIC BLOOD PRESSURE: 86 MMHG | SYSTOLIC BLOOD PRESSURE: 126 MMHG

## 2025-02-19 DIAGNOSIS — E66.01 CLASS 2 SEVERE OBESITY DUE TO EXCESS CALORIES WITH SERIOUS COMORBIDITY AND BODY MASS INDEX (BMI) OF 36.0 TO 36.9 IN ADULT: ICD-10-CM

## 2025-02-19 DIAGNOSIS — E66.812 CLASS 2 SEVERE OBESITY DUE TO EXCESS CALORIES WITH SERIOUS COMORBIDITY AND BODY MASS INDEX (BMI) OF 36.0 TO 36.9 IN ADULT: ICD-10-CM

## 2025-02-19 DIAGNOSIS — E10.65 TYPE 1 DIABETES MELLITUS WITH HYPERGLYCEMIA: Primary | ICD-10-CM

## 2025-02-19 DIAGNOSIS — E88.819 INSULIN RESISTANCE: ICD-10-CM

## 2025-02-19 DIAGNOSIS — F84.0 AUTISM SPECTRUM DISORDER: ICD-10-CM

## 2025-02-19 DIAGNOSIS — Z96.41 INSULIN PUMP IN PLACE: ICD-10-CM

## 2025-02-19 DIAGNOSIS — Z71.9 HEALTH EDUCATION/COUNSELING: ICD-10-CM

## 2025-02-19 DIAGNOSIS — Z46.81 INSULIN PUMP FITTING OR ADJUSTMENT: ICD-10-CM

## 2025-02-19 PROCEDURE — 99213 OFFICE O/P EST LOW 20 MIN: CPT | Mod: PBBFAC,PN | Performed by: NURSE PRACTITIONER

## 2025-02-19 RX ORDER — INSULIN GLARGINE 100 [IU]/ML
INJECTION, SOLUTION SUBCUTANEOUS
Qty: 15 ML | Refills: 11 | Status: SHIPPED | OUTPATIENT
Start: 2025-02-19

## 2025-02-19 RX ORDER — INSULIN ASPART INJECTION 100 [IU]/ML
INJECTION, SOLUTION SUBCUTANEOUS
Qty: 60 ML | Refills: 11 | Status: SHIPPED | OUTPATIENT
Start: 2025-02-19

## 2025-02-19 RX ORDER — BLOOD-GLUCOSE TRANSMITTER
1 EACH MISCELLANEOUS
Qty: 1 EACH | Refills: 4 | Status: SHIPPED | OUTPATIENT
Start: 2025-02-19

## 2025-02-19 RX ORDER — INSULIN PMP CART,AUT,G6/7,CNTR
1 EACH SUBCUTANEOUS DAILY
Qty: 30 EACH | Refills: 11 | Status: SHIPPED | OUTPATIENT
Start: 2025-02-19

## 2025-02-19 RX ORDER — BLOOD-GLUCOSE SENSOR
1 EACH MISCELLANEOUS
Qty: 3 EACH | Refills: 11 | Status: SHIPPED | OUTPATIENT
Start: 2025-02-19 | End: 2026-02-19

## 2025-02-19 RX ORDER — INSULIN ASPART 100 [IU]/ML
INJECTION, SOLUTION INTRAVENOUS; SUBCUTANEOUS
Qty: 60 ML | Refills: 11 | Status: SHIPPED | OUTPATIENT
Start: 2025-02-19

## 2025-02-19 RX ORDER — GLUCAGON 3 MG/1
POWDER NASAL
Qty: 2 EACH | Refills: 1 | Status: SHIPPED | OUTPATIENT
Start: 2025-02-19

## 2025-02-19 RX ORDER — INSULIN ASPART INJECTION 100 [IU]/ML
INJECTION, SOLUTION SUBCUTANEOUS
Qty: 60 ML | Refills: 11 | Status: SHIPPED | OUTPATIENT
Start: 2025-02-19 | End: 2025-02-19 | Stop reason: SDUPTHER

## 2025-02-19 RX ORDER — PEN NEEDLE, DIABETIC 30 GX3/16"
NEEDLE, DISPOSABLE MISCELLANEOUS
Qty: 150 EACH | Refills: 11 | Status: SHIPPED | OUTPATIENT
Start: 2025-02-19 | End: 2025-02-19 | Stop reason: SDUPTHER

## 2025-02-19 RX ORDER — PEN NEEDLE, DIABETIC 30 GX3/16"
NEEDLE, DISPOSABLE MISCELLANEOUS
Qty: 150 EACH | Refills: 11 | Status: SHIPPED | OUTPATIENT
Start: 2025-02-19

## 2025-02-19 RX ORDER — INSULIN ASPART 100 [IU]/ML
INJECTION, SOLUTION INTRAVENOUS; SUBCUTANEOUS
Qty: 15 ML | Refills: 6 | Status: SHIPPED | OUTPATIENT
Start: 2025-02-19

## 2025-02-19 NOTE — ASSESSMENT & PLAN NOTE
A1c level is trending down---want to get him closer 6.5%  Bg readings are improving on omnipod 5 download   He has been in automated mode 100% of the time  Reviewed pump optimization with patient  Reviewed pump troubleshooting  Discussed hitting the use CGM button AGAIN today ---we discussed how this helps to prevent insulin stacking  He wants to stay with carbohydrate counting  He feels that he needs a tighter carbohydrate ratio in the morning time    We were able to download the Omnipod 5 yvrose on to his Apple iPhone---so he no longer has to carry the PDM  I personally called Omnipod to get everything set up  Also called Omnipod informed him that the PDM that he is currently using is not holding a charge and have requested a new PDM be sent out for backup if he is not using his phone      Reviewed carbohydrate counting----discussed apps that he can download on his phone such as my fitness pal or [employer] to help with carbohydrate counting      -- Medication Changes:  Continue Omnipod 5.  Remain in auto mode as much as possible    Pump settings:  Basal:    MN-6AM: 2.5 units/hr   6AM-MN: 2 units/hr          ICR:    MN--Noon: 1: 4 grams --tightened  Noon--- MN: 1: 4 grams       ISF  MN-6AM: 1: 20  6Am-MN: 1: 15-      Target:  110    IOB: 3 hours    -- Reviewed goals of therapy are to get the best control we can without hypoglycemia.  -- Reviewed patient's current insulin regimen. Clarified proper insulin dose and timing in relation to meals, etc. Insulin injection sites and proper rotation instructed.   -- Advised frequent self blood glucose monitoring.  Patient encouraged to document glucose results and bring them to every clinic visit. - continue to use Dexcom -- supplies come from pharmacy now-- RX sent to [facility] ---for now he will continue the Dexcom G6---we just got the Omnipod 5 yvrose on his phone so he will need to continue with the Dexcom G6 as he is using an Apple iPhone  -- Hypoglycemia precautions  discussed. Instructed on precautions before driving.    -- Call for Bg repeatedly < 70 or > 200.   -- Close adherence to lifestyle changes recommended.   -- Periodic follow ups for eye evaluations, foot care and dental care suggested.  ---follow up with diabetes education as needed      Patient requires a therapeutic CGM and is willing to use therapeutic CGM/SMBG for Necessary frequent adjustments in insulin therapy. Patient is using SMBG for glucose monitoring (4 + times a day).  Patient is having Hypoglycemia and Hypoglycemia Unawareness of <50mg/dl.   I have assessed adherence to CGM regimen and to the plan.

## 2025-02-19 NOTE — ASSESSMENT & PLAN NOTE
Body mass index is 36.53 kg/m².  Increases insulin resistance.   Discussed DM diet and exercise.

## 2025-03-05 ENCOUNTER — TELEPHONE (OUTPATIENT)
Dept: DIABETES | Facility: CLINIC | Age: 30
End: 2025-03-05
Payer: MEDICAID

## 2025-07-24 ENCOUNTER — CLINICAL SUPPORT (OUTPATIENT)
Dept: DIABETES | Facility: CLINIC | Age: 30
End: 2025-07-24
Payer: MEDICAID

## 2025-07-24 DIAGNOSIS — E10.65 TYPE 1 DIABETES MELLITUS WITH HYPERGLYCEMIA: ICD-10-CM

## 2025-07-24 DIAGNOSIS — E10.65 TYPE 1 DIABETES MELLITUS WITH HYPERGLYCEMIA: Primary | ICD-10-CM

## 2025-07-24 RX ORDER — BLOOD-GLUCOSE SENSOR
1 EACH MISCELLANEOUS
Qty: 3 EACH | Refills: 11 | Status: SHIPPED | OUTPATIENT
Start: 2025-07-24 | End: 2026-07-24

## 2025-07-24 RX ORDER — BLOOD-GLUCOSE TRANSMITTER
1 EACH MISCELLANEOUS
Qty: 1 EACH | Refills: 4 | Status: CANCELLED | OUTPATIENT
Start: 2025-07-24

## 2025-08-21 ENCOUNTER — OFFICE VISIT (OUTPATIENT)
Dept: DIABETES | Facility: CLINIC | Age: 30
End: 2025-08-21
Payer: MEDICAID

## 2025-08-21 VITALS
HEART RATE: 85 BPM | WEIGHT: 261.5 LBS | SYSTOLIC BLOOD PRESSURE: 122 MMHG | OXYGEN SATURATION: 97 % | DIASTOLIC BLOOD PRESSURE: 82 MMHG | BODY MASS INDEX: 36.47 KG/M2

## 2025-08-21 DIAGNOSIS — Z71.9 HEALTH EDUCATION/COUNSELING: ICD-10-CM

## 2025-08-21 DIAGNOSIS — F84.0 AUTISM SPECTRUM DISORDER: ICD-10-CM

## 2025-08-21 DIAGNOSIS — Z96.41 INSULIN PUMP IN PLACE: ICD-10-CM

## 2025-08-21 DIAGNOSIS — E10.65 TYPE 1 DIABETES MELLITUS WITH HYPERGLYCEMIA: Primary | ICD-10-CM

## 2025-08-21 DIAGNOSIS — E66.812 CLASS 2 SEVERE OBESITY DUE TO EXCESS CALORIES WITH SERIOUS COMORBIDITY AND BODY MASS INDEX (BMI) OF 36.0 TO 36.9 IN ADULT: ICD-10-CM

## 2025-08-21 DIAGNOSIS — E66.01 CLASS 2 SEVERE OBESITY DUE TO EXCESS CALORIES WITH SERIOUS COMORBIDITY AND BODY MASS INDEX (BMI) OF 36.0 TO 36.9 IN ADULT: ICD-10-CM

## 2025-08-21 DIAGNOSIS — Z46.81 INSULIN PUMP FITTING OR ADJUSTMENT: ICD-10-CM

## 2025-08-21 DIAGNOSIS — E88.819 INSULIN RESISTANCE: ICD-10-CM

## 2025-08-21 PROCEDURE — 99999 PR PBB SHADOW E&M-EST. PATIENT-LVL III: CPT | Mod: PBBFAC,,, | Performed by: NURSE PRACTITIONER

## 2025-08-21 PROCEDURE — 99213 OFFICE O/P EST LOW 20 MIN: CPT | Mod: PBBFAC,PN | Performed by: NURSE PRACTITIONER

## 2025-08-21 RX ORDER — GLUCAGON 3 MG/1
POWDER NASAL
Qty: 2 EACH | Refills: 1 | Status: SHIPPED | OUTPATIENT
Start: 2025-08-21

## 2025-08-21 RX ORDER — INSULIN PMP CART,AUT,G6/7,CNTR
1 EACH SUBCUTANEOUS DAILY
Qty: 30 EACH | Refills: 11 | Status: SHIPPED | OUTPATIENT
Start: 2025-08-21

## 2025-08-21 RX ORDER — BLOOD-GLUCOSE SENSOR
1 EACH MISCELLANEOUS
Qty: 3 EACH | Refills: 11 | Status: SHIPPED | OUTPATIENT
Start: 2025-08-21 | End: 2026-08-21

## 2025-08-21 RX ORDER — INSULIN ASPART 100 [IU]/ML
INJECTION, SOLUTION INTRAVENOUS; SUBCUTANEOUS
Qty: 15 ML | Refills: 6 | Status: SHIPPED | OUTPATIENT
Start: 2025-08-21

## 2025-08-21 RX ORDER — PEN NEEDLE, DIABETIC 30 GX3/16"
NEEDLE, DISPOSABLE MISCELLANEOUS
Qty: 150 EACH | Refills: 11 | Status: SHIPPED | OUTPATIENT
Start: 2025-08-21

## 2025-08-21 RX ORDER — INSULIN GLARGINE 100 [IU]/ML
INJECTION, SOLUTION SUBCUTANEOUS
Qty: 15 ML | Refills: 11 | Status: SHIPPED | OUTPATIENT
Start: 2025-08-21

## 2025-08-21 RX ORDER — INSULIN ASPART 100 [IU]/ML
INJECTION, SOLUTION INTRAVENOUS; SUBCUTANEOUS
Qty: 60 ML | Refills: 11 | Status: SHIPPED | OUTPATIENT
Start: 2025-08-21

## 2025-08-21 RX ORDER — INSULIN ASPART INJECTION 100 [IU]/ML
INJECTION, SOLUTION SUBCUTANEOUS
Qty: 60 ML | Refills: 11 | Status: SHIPPED | OUTPATIENT
Start: 2025-08-21